# Patient Record
Sex: FEMALE | Race: WHITE | NOT HISPANIC OR LATINO | Employment: UNEMPLOYED | URBAN - METROPOLITAN AREA
[De-identification: names, ages, dates, MRNs, and addresses within clinical notes are randomized per-mention and may not be internally consistent; named-entity substitution may affect disease eponyms.]

---

## 2017-01-12 ENCOUNTER — OFFICE VISIT (OUTPATIENT)
Dept: OBSTETRICS AND GYNECOLOGY | Facility: CLINIC | Age: 37
End: 2017-01-12

## 2017-01-12 VITALS
WEIGHT: 231.2 LBS | BODY MASS INDEX: 39.47 KG/M2 | HEART RATE: 107 BPM | HEIGHT: 64 IN | DIASTOLIC BLOOD PRESSURE: 91 MMHG | SYSTOLIC BLOOD PRESSURE: 129 MMHG

## 2017-01-12 DIAGNOSIS — Z12.4 ROUTINE CERVICAL SMEAR: ICD-10-CM

## 2017-01-12 DIAGNOSIS — N63.20 BREAST MASS, LEFT: ICD-10-CM

## 2017-01-12 DIAGNOSIS — N64.4 BREAST PAIN, LEFT: Primary | ICD-10-CM

## 2017-01-12 DIAGNOSIS — R10.2 VAGINAL PAIN: ICD-10-CM

## 2017-01-12 DIAGNOSIS — N39.41 URGE INCONTINENCE: ICD-10-CM

## 2017-01-12 PROCEDURE — 99204 OFFICE O/P NEW MOD 45 MIN: CPT | Performed by: OBSTETRICS & GYNECOLOGY

## 2017-01-12 NOTE — PROGRESS NOTES
Subjective   Kamilla Taylor is a 36 y.o. female    CC: Painful intercourse: for a few weeks has been experiencing intense pain with orgasm, at the upper left apex of vagina.  Has not occurred prior.  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  Has bothersome urge incontinence: no stress component.  Has nocturia; no involuntary voiding, or hematuria.  No involuntary voiding or loss of urine with coitus.  Has had mammography and ultrasound ofr left sbreast pain, occ. Skin redness of breast; and has a palpable tail of the breast or axillary lump.  Etiology has not been made.  No breast surg. Referral.      History of Present Illness: all three described in CC.    The following portions of the patient's history were reviewed and updated as appropriate: allergies, current medications, past family history, past medical history, past social history, past surgical history and problem list.    Review of Systems   Constitutional: Negative for fatigue and fever.   HENT: Negative for congestion.    Eyes: Negative for visual disturbance.   Respiratory: Negative for chest tightness and shortness of breath.    Cardiovascular: Negative for chest pain, palpitations and leg swelling.   Gastrointestinal: Negative for abdominal pain, blood in stool and nausea.   Endocrine: Negative for cold intolerance.   Genitourinary: Positive for urgency and vaginal pain. Negative for difficulty urinating, dysuria, frequency, genital sores, hematuria, menstrual problem, vaginal bleeding and vaginal discharge.   Musculoskeletal: Negative for back pain.   Skin: Negative for color change and rash.   Neurological: Negative for headaches.   Psychiatric/Behavioral: Negative for sleep disturbance.       History reviewed. No pertinent past medical history.  Past Surgical History   Procedure Laterality Date   • Hysterectomy     •  section     • Dilatation and curettage     • Endoscopy      • Colonoscopy     • Appendectomy     • Diagnostic laparoscopy       OB History      Para Term  AB TAB SAB Ectopic Multiple Living    5 4   1  1   4        Menstrual History:  OB History      Para Term  AB TAB SAB Ectopic Multiple Living    5 4   1  1   4         No LMP recorded (lmp unknown). Patient has had a hysterectomy.       Family History   Problem Relation Age of Onset   • Uterine cancer Mother    • Breast cancer Paternal Grandmother    • Breast cancer Paternal Aunt      History   Smoking Status   • Never Smoker   Smokeless Tobacco   • Not on file     History   Alcohol Use No       Objective   Physical Exam   Constitutional: She is oriented to person, place, and time. She appears well-developed and well-nourished.   HENT:   Head: Normocephalic and atraumatic.   Right Ear: External ear normal.   Left Ear: External ear normal.   Nose: Nose normal.   Eyes: EOM are normal. Pupils are equal, round, and reactive to light.   Neck: Normal range of motion. Neck supple. No thyromegaly present.   Cardiovascular: Normal rate, regular rhythm and normal heart sounds.    No murmur heard.  Pulmonary/Chest: Effort normal and breath sounds normal. She has no wheezes. She has no rales. She exhibits no tenderness. Right breast exhibits no inverted nipple, no mass, no nipple discharge, no skin change and no tenderness. Left breast exhibits mass and tenderness. Left breast exhibits no inverted nipple, no nipple discharge and no skin change. There is no breast swelling.       Abdominal: Soft. Bowel sounds are normal. She exhibits no distension and no mass. There is no tenderness. Hernia confirmed negative in the right inguinal area and confirmed negative in the left inguinal area.   Genitourinary: No breast tenderness. Pelvic exam was performed with patient supine. There is no rash, tenderness or lesion on the right labia. There is no rash, tenderness or lesion on the left labia. No erythema,  tenderness or bleeding in the vagina. No vaginal discharge found.   Genitourinary Comments: Reported hyst with bS&O    Some tenderness noted at vaginal cuff on the left of midline.  No mass palpated.    S/P adenocarcinoma in situ of cervix on hyst path.   Musculoskeletal: Normal range of motion. She exhibits no edema.   Neurological: She is alert and oriented to person, place, and time.   Skin: Skin is warm and dry. No rash noted.   Psychiatric: She has a normal mood and affect. Judgment normal.   Nursing note and vitals reviewed.        Assessment/Plan   Kamilla was seen today for annual exam.    Diagnoses and all orders for this visit:    Breast pain, left  -     Cancel: Mammo Diagnostic Left With CAD  -     US Breast Left Complete; Future  -     Ambulatory Referral to Breast Surgery  -     Mammo Diagnostic Bilateral With CAD; Future    Breast mass, left  -     Cancel: Mammo Diagnostic Left With CAD  -     US Breast Left Complete; Future  -     Ambulatory Referral to Breast Surgery  -     Mammo Diagnostic Bilateral With CAD; Future    Urge incontinence  -     Ambulatory Referral to Urology    Vaginal pain: intense vaginal cuff pain on left with orgasm; TVUS ordered.    Routine cervical smear  -     IGP, Aptima HPV, Rfx 16 / 18,45    Will see after TVUS.

## 2017-01-12 NOTE — MR AVS SNAPSHOT
Kamilla Taylor   2017 9:30 AM   Office Visit    Dept Phone:  [phone]   Encounter #:  [id number]    Provider:  Christiano Steve MD   Department:  [facility]                Your Full Care Plan              Your Updated Medication List      Notice  As of 2017 10:31 AM    You have not been prescribed any medications.            We Performed the Following     Ambulatory Referral to Breast Surgery     Ambulatory Referral to Urology     Mammo Diagnostic Left With CAD       You Were Diagnosed With        Codes Comments    Breast pain, left    -  Primary ICD-10-CM: N64.4  ICD-9-CM: 611.71     Breast mass, left     ICD-10-CM: N63  ICD-9-CM: 611.72     Urge incontinence     ICD-10-CM: N39.41  ICD-9-CM: 788.31     Vaginal pain     ICD-10-CM: R10.2  ICD-9-CM: 625.9       Instructions     None    Patient Instructions History      Upcoming Appointments     Visit Type Date Time Department    NEW PATIENT 2017  9:30 AM [facility] [place]    OB FOLLOWUP 2017 10:30 AM [facility] [place]    ULTRASOUND 2017  9:00 AM [facility] [place]      MyChart Signup     [facility] [email or web address] allows you to send messages to your doctor, view your test results, renew your prescriptions, schedule appointments, and more. To sign up, go to [employer] and click on the Sign Up Now link in the New User? box. Enter your [email or web address] Activation Code exactly as it appears below along with the last four digits of your Social Security Number and your Date of Birth () to complete the sign-up process. If you do not sign up before the expiration date, you must request a new code.    [email or web address] Activation Code: [email or web address]  Expires: 2017 10:31 AM    If you have questions, you can email [email or web address] [email or web address] or call [phone] to talk to our [email or web address] staff. Remember, [email or web address] is NOT to be used for urgent needs. For medical emergencies, dial  "911.               Other Info from Your Visit           Your Appointments     Jan 19, 2017  9:00 AM EST   Ultrasound with ULTRASOUND [facility] (--)    [address]  [facility] [address] [phone]            Jan 19, 2017 10:30 AM EST   OB FOLLOWUP with Christiano Steve MD   [facility] (--)    [address]  [facility] [address] [phone]              Allergies     Pseudoeph-doxylamine-dm-apap  Hives    Azithromycin  Hives    Morphine  Itching    Nickel  Other (See Comments)    REDNESS, SWELLING.  CANNOT WEAR NICKEL EARRINGS    Onion  Nausea And Vomiting      Reason for Visit     Annual Exam Pain with intercourse      Vital Signs     Blood Pressure Pulse Height Weight Last Menstrual Period Body Mass Index    129/91 107 64\" (162.6 cm) 231 lb 3.2 oz (105 kg) (LMP Unknown) 39.69 kg/m2    Smoking Status                   Never Smoker           Problems and Diagnoses Noted     Breast pain, left    -  Primary    Breast mass, left        Urge incontinence of urine        Vaginal pain            "

## 2017-01-16 LAB
CYTOLOGIST CVX/VAG CYTO: NORMAL
CYTOLOGY CVX/VAG DOC THIN PREP: NORMAL
DX ICD CODE: NORMAL
HIV 1 & 2 AB SER-IMP: NORMAL
HPV I/H RISK 4 DNA CVX QL PROBE+SIG AMP: NEGATIVE
OTHER STN SPEC: NORMAL
PATH REPORT.FINAL DX SPEC: NORMAL
STAT OF ADQ CVX/VAG CYTO-IMP: NORMAL

## 2017-01-17 ENCOUNTER — APPOINTMENT (OUTPATIENT)
Dept: WOMENS IMAGING | Facility: HOSPITAL | Age: 37
End: 2017-01-17

## 2017-01-17 PROCEDURE — 76641 ULTRASOUND BREAST COMPLETE: CPT | Performed by: RADIOLOGY

## 2017-01-17 PROCEDURE — 77062 BREAST TOMOSYNTHESIS BI: CPT | Performed by: RADIOLOGY

## 2017-01-17 PROCEDURE — 77066 DX MAMMO INCL CAD BI: CPT | Performed by: RADIOLOGY

## 2017-01-17 PROCEDURE — G0279 TOMOSYNTHESIS, MAMMO: HCPCS | Performed by: RADIOLOGY

## 2017-01-17 PROCEDURE — G0204 DX MAMMO INCL CAD BI: HCPCS | Performed by: RADIOLOGY

## 2017-01-18 ENCOUNTER — TELEPHONE (OUTPATIENT)
Dept: OBSTETRICS AND GYNECOLOGY | Facility: CLINIC | Age: 37
End: 2017-01-18

## 2017-01-18 NOTE — TELEPHONE ENCOUNTER
Radiology breast studies are OK.  If she is having breast pain, I'd like to recheck her in 2-3 weeks.

## 2017-01-31 ENCOUNTER — OFFICE VISIT (OUTPATIENT)
Dept: OBSTETRICS AND GYNECOLOGY | Facility: CLINIC | Age: 37
End: 2017-01-31

## 2017-01-31 ENCOUNTER — OFFICE VISIT (OUTPATIENT)
Dept: MAMMOGRAPHY | Facility: CLINIC | Age: 37
End: 2017-01-31

## 2017-01-31 ENCOUNTER — PROCEDURE VISIT (OUTPATIENT)
Dept: OBSTETRICS AND GYNECOLOGY | Facility: CLINIC | Age: 37
End: 2017-01-31

## 2017-01-31 VITALS
WEIGHT: 234 LBS | HEIGHT: 64 IN | BODY MASS INDEX: 39.95 KG/M2 | DIASTOLIC BLOOD PRESSURE: 62 MMHG | SYSTOLIC BLOOD PRESSURE: 118 MMHG | HEART RATE: 95 BPM | OXYGEN SATURATION: 95 % | TEMPERATURE: 97.8 F

## 2017-01-31 VITALS
HEART RATE: 96 BPM | SYSTOLIC BLOOD PRESSURE: 130 MMHG | BODY MASS INDEX: 39.95 KG/M2 | DIASTOLIC BLOOD PRESSURE: 81 MMHG | WEIGHT: 234 LBS | HEIGHT: 64 IN

## 2017-01-31 DIAGNOSIS — R10.2 PELVIC PAIN IN FEMALE: Primary | ICD-10-CM

## 2017-01-31 DIAGNOSIS — R10.2 VAGINAL PAIN: Primary | ICD-10-CM

## 2017-01-31 DIAGNOSIS — N39.41 URGE INCONTINENCE: ICD-10-CM

## 2017-01-31 DIAGNOSIS — N64.4 BREAST PAIN, LEFT: Primary | ICD-10-CM

## 2017-01-31 PROCEDURE — 99213 OFFICE O/P EST LOW 20 MIN: CPT | Performed by: OBSTETRICS & GYNECOLOGY

## 2017-01-31 PROCEDURE — 99203 OFFICE O/P NEW LOW 30 MIN: CPT | Performed by: SURGERY

## 2017-01-31 PROCEDURE — 76830 TRANSVAGINAL US NON-OB: CPT | Performed by: OBSTETRICS & GYNECOLOGY

## 2017-01-31 NOTE — MR AVS SNAPSHOT
Kamilla Taylor   2017 9:30 AM   Office Visit    Dept Phone:  [phone]   Encounter #:  [id number]    Provider:  Guanakito Rosa MD   Department:  [facility] BREAST SURGERY                Your Full Care Plan              Your Updated Medication List      Notice  As of 2017 10:44 AM    You have not been prescribed any medications.            Instructions     None    Patient Instructions History      Upcoming Appointments     Visit Type Date Time Department    NEW PATIENT 2017  9:30 AM [facility] [place]    ULTRASOUND 2017  2:00 PM [facility] [place]    GYN FOLLOW UP 2017  2:30 PM [facility] [place]      MyChart Signup     [facility] [employer] allows you to send messages to your doctor, view your test results, renew your prescriptions, schedule appointments, and more. To sign up, go to [employer] and click on the Sign Up Now link in the New User? box. Enter your [employer] Activation Code exactly as it appears below along with the last four digits of your Social Security Number and your Date of Birth () to complete the sign-up process. If you do not sign up before the expiration date, you must request a new code.    [employer] Activation Code: [email or web address]  Expires: 2017 10:44 AM    If you have questions, you can email [email or web address] or call [phone] to talk to our [employer] staff. Remember, [employer] is NOT to be used for urgent needs. For medical emergencies, dial 911.               Other Info from Your Visit           Your Appointments     2017  2:00 PM EST   Ultrasound with ULTRASOUND [facility] (--)    [address]  [facility] [address]   [phone]            2017  2:30 PM EST   GYN FOLLOW UP with Christiano Steve MD   [facility] (--)    [address]  [facility]  "[id number] [phone]              Allergies     Pseudoeph-doxylamine-dm-apap  Hives    Azithromycin  Hives    Morphine  Itching    Nickel  Other (See Comments)    REDNESS, SWELLING.  CANNOT WEAR NICKEL EARRINGS    Onion  Nausea And Vomiting      Reason for Visit     Mass Left Breast    Pain           Vital Signs     Blood Pressure Pulse Temperature Height Weight Last Menstrual Period    118/62 (BP Location: Left arm) 95 97.8 °F (36.6 °C) 64\" (162.6 cm) 234 lb (106 kg) (LMP Unknown)    Oxygen Saturation Body Mass Index Smoking Status             95% 40.17 kg/m2 Never Smoker           "

## 2017-01-31 NOTE — LETTER
January 31, 2017     Christiano Steve MD  [address]  [address]  [facility] [address]    Patient: Kamilla Taylor   YOB: 1980   Date of Visit: 1/31/2017       Dear Dr. Power MD:    Thank you for referring Kamilla Taylor to me for evaluation. Below are the relevant portions of my assessment and plan of care.  Plan:  She was accompanied today by her .  We discussed that the differential diagnosis of breast pain includes, but is not limited to: fibrocystic condition, macrocysts, fibroadenomas, breast cancer,  trauma to the breast or chest wall, inflammation or  other musculoskeletal disturbances of the chest wall.  There are no concerning findings on her examination today or on her most recent imaging for a focal cause of her pain- ie a mass, inflammation, or trauma. Both her exam and imaging are in good order. The most likely etiology of her breast pain is fibrocystic condition, meaning a hormonal responsiveness of the breast tissue.  We discussed that this pain can be aggravated by many things, including stress, caffeine, and fluctuations in hormone levels.  We discussed the most common interventions to alleviate her symptoms, including wearing a supportive bra, reducing caffeine intake, oral vitamin E 400 units qday or BID, or evening primrose oil 2000-3000mg orally daily. She understood.   She has already tried vitamin E and doesn't consume caffeine.  She will begin taking evening per Eduarda oil and I plan to prescribe diclofenac patches to see if that will help with her pain.    If you have questions, please do not hesitate to call me. I look forward to following Kamilla along with you.         Sincerely,        Guanakito Rosa MD        CC: No Recipients  Guanakito Rosa MD  1/31/2017  3:19 PM  Signed  Chief Complaint: Kamilla Taylor is a 36 y.o.. female here today for Mass (Left Breast) and Pain        History of Present Illness:  Patient presents with breast  pain.  The pain is only located on the left side and seems to be in the lateral aspect of the breast with occasional shooting pains to the nipple.  In 2015 there was some redness at the lateral edge of the areola and the patient noticed some skin dimpling nearby.  She actually had pictures of this on her cell phone and I was able to view them.  She describes the pain as sharp and moderate to severe.  The patient is frustrated that the pain will not go away.  2 weeks ago she had mammograms which did not show any abnormalities in the area where she is hurting.  They did discover a 22 mm fat-containing axillary lymph node that had a normal appearance although it was slightly large.  I have reviewed her imaging studies and agree with the findings.  She denies any pleuritic component to this pain and she has had a hysterectomy so she is unable to relate it to her menstrual cycle.  She does not consume caffeine or chocolate      Review of Systems:  Review of Systems   Genitourinary: Positive for dyspareunia and frequency.    Neurological: Positive for headaches.   All other systems reviewed and are negative.       Past Medical and Surgical History:  Breast Biopsy History:  Patient has not had a breast biopsy in the past.  Breast Cancer HIstory:  Patient does not have a past medical history of breast cancer.  Breast Operations, and year:  None    History   Smoking Status   • Never Smoker   Smokeless Tobacco   • Not on file     Obstetric History:  Patient is postmenopausal due to removal of her uterus in the following year:   Number of pregnancies:5  Number of live births: 4  Number of abortions or miscarriages: 1  Age of delivery of first child: 18  Patient breast fed, for the following lenth of time:42  Length of time taking birth control pills:1.5 years  Patient has never taken hormone replacement    Past Surgical History   Procedure Laterality Date   • Hysterectomy     •  section     • Dilatation  and curettage     • Endoscopy     • Colonoscopy     • Appendectomy     • Diagnostic laparoscopy     • Knee surgery     • Appendectomy         Past Medical History   Diagnosis Date   • Asthma        Prior Hospitalizations, other than for surgery or childbirth, and year:  none    Social History:  Patient is .  Patient has 2 daughters. and Patient has 2 sons.    Family History:  Family History   Problem Relation Age of Onset   • Uterine cancer Mother    • Depression Mother    • Diabetes Mother    • Hyperlipidemia Mother    • Breast cancer Paternal Grandmother    • Clotting disorder Paternal Grandmother    • Breast cancer Paternal Aunt    • Asthma Daughter    • Asthma Son    • Hyperlipidemia Maternal Aunt    • Melanoma Maternal Aunt    • Alcohol abuse Maternal Grandmother    • Diabetes Maternal Grandmother    • Hyperlipidemia Maternal Grandmother    • Heart attack Paternal Grandfather        Vital Signs:  Vitals:    01/31/17 1005   BP: 118/62   Pulse: 95   Temp: 97.8 °F (36.6 °C)   SpO2: 95%       Medications:    Current Outpatient Prescriptions:   •  Prenatal Vit-Min-FA-Fish Oil ([employer] PRENATAL GUMMY PO), Take 2 tablets by mouth daily., Disp: , Rfl:      Physical Examination:  General Appearance:   Patient is in no distress.  She is well kept and has an obese build.   Psychiatric:  Patient with appropriate mood and affect. Alert and oriented to self, time, and place.    Breast, RIGHT:  large sized, symmetric with the contralateral side.  Breast skin is without erythema, edema, rashes.  There are no visible abnormalities upon inspection during the arm-raising maneuver or with hands on hips in the sitting position. There is no nipple retraction, discharge or nipple/areolar skin changes.There are no masses palpable in the sitting or supine positions.    Breast, LEFT:  large sized, symmetric with the contralateral side.  Breast skin is without erythema, edema, rashes.  There are no visible abnormalities upon  inspection during the arm-raising maneuver or with hands on hips in the sitting position. There is no nipple retraction, discharge or nipple/areolar skin changes.There are no masses palpable in the sitting or supine positions.  She is rather tender in the upper outer quadrant with some fibrocystic nodularity but I do not detect a dominant mass.  I could not definitely feel the enlarged lymph node.    Lymphatic:  There is no axillary, cervical, infraclavicular, or supraclavicular adenopathy bilaterally.  Eyes:  Pupils are round and reactive to light.  Cardiovascular:  Heart rate and rhythm are regular.  Respiratory:  Lungs are clear bilaterally with no crackles or wheezes in any lung field.  Gastrointestinal:  Abdomen is soft, nondistended, and nontender.  There is no evidence of hepatosplenomegaly There are  scars from previous surgery.    Musculoskeletal:  Good strength in all 4 extremities.   There is good range of motion in both shoulders.    Skin:  No new skin lesions or rashes on the skin excluding the breast (see breast exam above).    Assessment:  There are no diagnoses linked to this encounter.    Plan:  She was accompanied today by her .  We discussed that the differential diagnosis of breast pain includes, but is not limited to: fibrocystic condition, macrocysts, fibroadenomas, breast cancer,  trauma to the breast or chest wall, inflammation or  other musculoskeletal disturbances of the chest wall.  There are no concerning findings on her examination today or on her most recent imaging for a focal cause of her pain- ie a mass, inflammation, or trauma. Both her exam and imaging are in good order. The most likely etiology of her breast pain is fibrocystic condition, meaning a hormonal responsiveness of the breast tissue.  We discussed that this pain can be aggravated by many things, including stress, caffeine, and fluctuations in hormone levels.  We discussed the most common interventions to alleviate  her symptoms, including wearing a supportive bra, reducing caffeine intake, oral vitamin E 400 units qday or BID, or evening primrose oil 2000-3000mg orally daily. She understood.   She has already tried vitamin E and doesn't consume caffeine.  She will begin taking evening per Eduarda oil and I plan to prescribe diclofenac patches to see if that will help with her pain.      CPT coding:    Next Appointment:  No Follow-up on file.

## 2017-01-31 NOTE — PROGRESS NOTES
Chief Complaint: Kamilla Taylor is a 36 y.o.. female here today for Mass (Left Breast) and Pain        History of Present Illness:  Patient presents with breast pain.  The pain is only located on the left side and seems to be in the lateral aspect of the breast with occasional shooting pains to the nipple.  In September 2015 there was some redness at the lateral edge of the areola and the patient noticed some skin dimpling nearby.  She actually had pictures of this on her cell phone and I was able to view them.  She describes the pain as sharp and moderate to severe.  The patient is frustrated that the pain will not go away.  2 weeks ago she had mammograms which did not show any abnormalities in the area where she is hurting.  They did discover a 22 mm fat-containing axillary lymph node that had a normal appearance although it was slightly large.  I have reviewed her imaging studies and agree with the findings.  She denies any pleuritic component to this pain and she has had a hysterectomy so she is unable to relate it to her menstrual cycle.  She does not consume caffeine or chocolate      Review of Systems:  Review of Systems   Genitourinary: Positive for dyspareunia and frequency.    Neurological: Positive for headaches.   All other systems reviewed and are negative.       Past Medical and Surgical History:  Breast Biopsy History:  Patient has not had a breast biopsy in the past.  Breast Cancer HIstory:  Patient does not have a past medical history of breast cancer.  Breast Operations, and year:  None    History   Smoking Status   • Never Smoker   Smokeless Tobacco   • Not on file     Obstetric History:  Patient is postmenopausal due to removal of her uterus in the following year:2012   Number of pregnancies:5  Number of live births: 4  Number of abortions or miscarriages: 1  Age of delivery of first child: 18  Patient breast fed, for the following lenth of time:42  Length of time taking birth control  pills:1.5 years  Patient has never taken hormone replacement    Past Surgical History   Procedure Laterality Date   • Hysterectomy     •  section     • Dilatation and curettage     • Endoscopy     • Colonoscopy     • Appendectomy     • Diagnostic laparoscopy     • Knee surgery     • Appendectomy         Past Medical History   Diagnosis Date   • Asthma        Prior Hospitalizations, other than for surgery or childbirth, and year:  none    Social History:  Patient is .  Patient has 2 daughters. and Patient has 2 sons.    Family History:  Family History   Problem Relation Age of Onset   • Uterine cancer Mother    • Depression Mother    • Diabetes Mother    • Hyperlipidemia Mother    • Breast cancer Paternal Grandmother    • Clotting disorder Paternal Grandmother    • Breast cancer Paternal Aunt    • Asthma Daughter    • Asthma Son    • Hyperlipidemia Maternal Aunt    • Melanoma Maternal Aunt    • Alcohol abuse Maternal Grandmother    • Diabetes Maternal Grandmother    • Hyperlipidemia Maternal Grandmother    • Heart attack Paternal Grandfather        Vital Signs:  Vitals:    17 1005   BP: 118/62   Pulse: 95   Temp: 97.8 °F (36.6 °C)   SpO2: 95%       Medications:    Current Outpatient Prescriptions:   •  Prenatal Vit-Min-FA-Fish Oil ([facility] PRENATAL GUMMY PO), Take 2 tablets by mouth daily., Disp: , Rfl:      Physical Examination:  General Appearance:   Patient is in no distress.  She is well kept and has an obese build.   Psychiatric:  Patient with appropriate mood and affect. Alert and oriented to self, time, and place.    Breast, RIGHT:  large sized, symmetric with the contralateral side.  Breast skin is without erythema, edema, rashes.  There are no visible abnormalities upon inspection during the arm-raising maneuver or with hands on hips in the sitting position. There is no nipple retraction, discharge or nipple/areolar skin changes.There are no masses palpable in the sitting or supine  positions.    Breast, LEFT:  large sized, symmetric with the contralateral side.  Breast skin is without erythema, edema, rashes.  There are no visible abnormalities upon inspection during the arm-raising maneuver or with hands on hips in the sitting position. There is no nipple retraction, discharge or nipple/areolar skin changes.There are no masses palpable in the sitting or supine positions.  She is rather tender in the upper outer quadrant with some fibrocystic nodularity but I do not detect a dominant mass.  I could not definitely feel the enlarged lymph node.    Lymphatic:  There is no axillary, cervical, infraclavicular, or supraclavicular adenopathy bilaterally.  Eyes:  Pupils are round and reactive to light.  Cardiovascular:  Heart rate and rhythm are regular.  Respiratory:  Lungs are clear bilaterally with no crackles or wheezes in any lung field.  Gastrointestinal:  Abdomen is soft, nondistended, and nontender.  There is no evidence of hepatosplenomegaly There are  scars from previous surgery.    Musculoskeletal:  Good strength in all 4 extremities.   There is good range of motion in both shoulders.    Skin:  No new skin lesions or rashes on the skin excluding the breast (see breast exam above).    Assessment:  There are no diagnoses linked to this encounter.    Plan:  She was accompanied today by her .  We discussed that the differential diagnosis of breast pain includes, but is not limited to: fibrocystic condition, macrocysts, fibroadenomas, breast cancer,  trauma to the breast or chest wall, inflammation or  other musculoskeletal disturbances of the chest wall.  There are no concerning findings on her examination today or on her most recent imaging for a focal cause of her pain- ie a mass, inflammation, or trauma. Both her exam and imaging are in good order. The most likely etiology of her breast pain is fibrocystic condition, meaning a hormonal responsiveness of the breast tissue.  We  discussed that this pain can be aggravated by many things, including stress, caffeine, and fluctuations in hormone levels.  We discussed the most common interventions to alleviate her symptoms, including wearing a supportive bra, reducing caffeine intake, oral vitamin E 400 units qday or BID, or evening primrose oil 2000-3000mg orally daily. She understood.   She has already tried vitamin E and doesn't consume caffeine.  She will begin taking evening per Eduarda oil and I plan to prescribe diclofenac patches to see if that will help with her pain.      CPT coding:    Next Appointment:  No Follow-up on file.

## 2017-01-31 NOTE — MR AVS SNAPSHOT
Kamilla Taylor   2017 2:00 PM   Procedure visit    Dept Phone:  [phone]   Encounter #:  [id number]    Provider:  [facility]   Department:  [facility] OB GYN                Your Full Care Plan              Your Updated Medication List      Notice  As of 2017  2:45 PM    You have not been prescribed any medications.            We Performed the Following     US Non-ob Transvaginal       You Were Diagnosed With        Codes Comments    Vaginal pain    -  Primary ICD-10-CM: R10.2  ICD-9-CM: 625.9       Instructions     None    Patient Instructions History      Upcoming Appointments     Visit Type Date Time Department    NEW PATIENT 2017  9:30 AM [facility]    ULTRASOUND 2017  2:00 PM [facility] [place]    GYN FOLLOW UP 2017  2:30 PM [facility] [place]    FOLLOW UP 2017  9:00 AM [facility]      Cellfirehart Signup     [facility] [employer] allows you to send messages to your doctor, view your test results, renew your prescriptions, schedule appointments, and more. To sign up, go to [email or web address] and click on the Sign Up Now link in the New User? box. Enter your [employer] Activation Code exactly as it appears below along with the last four digits of your Social Security Number and your Date of Birth () to complete the sign-up process. If you do not sign up before the expiration date, you must request a new code.    [employer] Activation Code: [email or web address]  Expires: 2017 10:44 AM    If you have questions, you can email Sanders [email or web address]" or call [phone] to talk to our [employer] staff. Remember, [employer] is NOT to be used for urgent needs. For medical emergencies, dial 911.               Other Info from Your Visit           Your Appointments     2017  9:00 AM EDT   Follow Up with Guanakito Rosa MD   [facility] BREAST SURGERY (--)    [address] [facility]  [facility] [id number] [phone]           Arrive 15 minutes prior to appointment.              Allergies     Pseudoeph-doxylamine-dm-apap  Hives    Azithromycin  Hives    Morphine  Itching    Nickel  Other (See Comments)    REDNESS, SWELLING.  CANNOT WEAR NICKEL EARRINGS    Onion  Nausea And Vomiting      Vital Signs     Last Menstrual Period Smoking Status                (LMP Unknown) Never Smoker          Problems and Diagnoses Noted     Vaginal pain    -  Primary

## 2017-01-31 NOTE — MR AVS SNAPSHOT
Kamilla Taylor   1/31/2017 2:30 PM   Office Visit    Dept Phone:  [phone]   Encounter #:  [id number]    Provider:  Christiano Steve MD   Department:  [facility] OB [facility]                Your Full Care Plan              Today's Medication Changes          These changes are accurate as of: 1/31/17  4:26 PM.  If you have any questions, ask your nurse or doctor.               New Medication(s)Ordered:     diclofenac 1.3 % patch patch   Commonly known as:  FLECTOR   Apply 1 patch topically 2 (Two) Times a Day.   Started by:  Guanakito Rosa MD            Where to Get Your Medications      These medications were sent to [address] [facility] - [phone] [facility] [phone] [place] [address]     Phone:  [phone]     diclofenac 1.3 % patch patch                  Your Updated Medication List          This list is accurate as of: 1/31/17  4:26 PM.  Always use your most recent med list.                diclofenac 1.3 % patch patch   Commonly known as:  FLECTOR   Apply 1 patch topically 2 (Two) Times a Day.               We Performed the Following     Ambulatory Referral to Urology       You Were Diagnosed With        Codes Comments    Pelvic pain in female    -  Primary ICD-10-CM: R10.2  ICD-9-CM: 625.9     Urge incontinence     ICD-10-CM: N39.41  ICD-9-CM: 788.31       Instructions     None    Patient Instructions History      Upcoming Appointments     Visit Type Date Time Department    NEW PATIENT 1/31/2017  9:30 AM [facility]    ULTRASOUND 1/31/2017  2:00 PM [facility]    GYN FOLLOW UP 1/31/2017  2:30 PM [facility] [place]    FOLLOW UP 4/25/2017  9:00 AM [facility]      MyChart Signup     [facility] [employer] allows you to send messages to your doctor, view your test results, renew your prescriptions, schedule appointments, and more. To sign up, go  "to [email or web address] and click on the Sign Up Now link in the New User? box. Enter your [employer] Activation Code exactly as it appears below along with the last four digits of your Social Security Number and your Date of Birth () to complete the sign-up process. If you do not sign up before the expiration date, you must request a new code.    [employer] Activation Code: [email or web address]  Expires: 2017 10:44 AM    If you have questions, you can email [email or web address] or call [phone] to talk to our [employer] staff. Remember, [employer] is NOT to be used for urgent needs. For medical emergencies, dial 911.               Other Info from Your Visit           Your Appointments     2017  9:00 AM EDT   Follow Up with Guanakito Rosa MD   [facility] BREAST SURGERY (--)    [address] [id number] [phone]           Arrive 15 minutes prior to appointment.              Allergies     Pseudoeph-doxylamine-dm-apap  Hives    Azithromycin  Hives    Morphine  Itching    Nickel  Other (See Comments)    REDNESS, SWELLING.  CANNOT WEAR NICKEL EARRINGS    Onion  Nausea And Vomiting      Reason for Visit     Follow-up f/u to US      Vital Signs     Blood Pressure Pulse Height Weight Last Menstrual Period Body Mass Index    130/81 96 64\" (162.6 cm) 234 lb (106 kg) (LMP Unknown) 40.17 kg/m2    Smoking Status                   Never Smoker           Problems and Diagnoses Noted     Pelvic pain in female    -  Primary    Urge incontinence of urine            "

## 2017-01-31 NOTE — PROGRESS NOTES
Subjective   Kamilla Taylor is a 36 y.o. female    CC: F/u to US; hx of intense vag cuff pain with orgasm.  No actual pain with coitus.    History of Present Illness  Pt has US today for pain with orgasm.  Has hx of ureteral lithiasis in the past with tx by ultrasound break-up and passage.  Has had C/S and hyst in the past. Also, appendectomy. Had incidental finding on path at [facility] of adenoca in situ of cx.    The following portions of the patient's history were reviewed and updated as appropriate: allergies, current medications, past family history, past medical history, past social history, past surgical history and problem list.    Review of Systems   Constitutional: Negative for fever.   Gastrointestinal: Negative for abdominal pain.   Genitourinary: Positive for pelvic pain. Negative for vaginal bleeding and vaginal discharge.   Musculoskeletal: Negative for back pain.       Past Medical History   Diagnosis Date   • Asthma      Past Surgical History   Procedure Laterality Date   • Hysterectomy     •  section     • Dilatation and curettage     • Endoscopy     • Colonoscopy     • Appendectomy     • Diagnostic laparoscopy     • Knee surgery     • Appendectomy       OB History      Para Term  AB TAB SAB Ectopic Multiple Living    5 4   1  1   4        Obstetric Comments    Took birth control for 1.5 years.         Menstrual History:  OB History      Para Term  AB TAB SAB Ectopic Multiple Living    5 4   1  1   4        Obstetric Comments    Took birth control for 1.5 years.            No LMP recorded (lmp unknown). Patient has had a hysterectomy.       Family History   Problem Relation Age of Onset   • Uterine cancer Mother    • Depression Mother    • Diabetes Mother    • Hyperlipidemia Mother    • Breast cancer Paternal Grandmother    • Clotting disorder Paternal Grandmother    • Breast cancer Paternal Aunt    • Asthma Daughter    • Asthma Son    • Hyperlipidemia Maternal  Aunt    • Melanoma Maternal Aunt    • Alcohol abuse Maternal Grandmother    • Diabetes Maternal Grandmother    • Hyperlipidemia Maternal Grandmother    • Heart attack Paternal Grandfather      History   Smoking Status   • Never Smoker   Smokeless Tobacco   • Not on file     History   Alcohol Use No       Objective   Physical Exam   Constitutional: She is oriented to person, place, and time. She appears well-developed and well-nourished.   Neurological: She is alert and oriented to person, place, and time.   Psychiatric: She has a normal mood and affect. Her behavior is normal. Judgment and thought content normal.   Nursing note and vitals reviewed.        Assessment/Plan   Kamilla was seen today for follow-up.    Diagnoses and all orders for this visit:    Pelvic pain in female (left vag cuff pain with orgasm) - normal ultrasound.  Will refer for another opinion.    Urge incontinence  -     Ambulatory Referral to Urology        Will call to schedule 2nd opinion as to etiology of the pain as described above.

## 2017-02-01 ENCOUNTER — TELEPHONE (OUTPATIENT)
Dept: MAMMOGRAPHY | Facility: CLINIC | Age: 37
End: 2017-02-01

## 2017-02-01 NOTE — TELEPHONE ENCOUNTER
Patient called to state the Rx you gave her for breast pain was denied by her insurance. Please advise patient at [phone]

## 2017-02-02 ENCOUNTER — TELEPHONE (OUTPATIENT)
Dept: MAMMOGRAPHY | Facility: CLINIC | Age: 37
End: 2017-02-02

## 2017-02-02 NOTE — TELEPHONE ENCOUNTER
Called the patient and left a message saying Dr. Rosa said it would be ok to use OTC Aspercreme lotion or cream twice a day to the painful area of the breast instead of the prescription. Told her she could call back if she had any further questions.     Danita Kothari RN

## 2017-02-03 ENCOUNTER — TELEPHONE (OUTPATIENT)
Dept: OBSTETRICS AND GYNECOLOGY | Facility: CLINIC | Age: 37
End: 2017-02-03

## 2017-02-03 NOTE — TELEPHONE ENCOUNTER
----- Message from Christiano Steve MD sent at 2/3/2017  8:10 AM EST -----  Regarding: consult with Dr. Sandoval  I've sent a message to Dr. Sandoval, alerting him to upcoming consult with pt.  Please schedule her a time to see him.

## 2017-02-06 ENCOUNTER — CONSULT (OUTPATIENT)
Dept: OBSTETRICS AND GYNECOLOGY | Facility: CLINIC | Age: 37
End: 2017-02-06

## 2017-02-06 VITALS
HEIGHT: 64 IN | WEIGHT: 235.2 LBS | HEART RATE: 102 BPM | BODY MASS INDEX: 40.15 KG/M2 | SYSTOLIC BLOOD PRESSURE: 134 MMHG | DIASTOLIC BLOOD PRESSURE: 79 MMHG

## 2017-02-06 DIAGNOSIS — IMO0002 ORGASM DISORDER: Primary | ICD-10-CM

## 2017-02-06 PROCEDURE — 99214 OFFICE O/P EST MOD 30 MIN: CPT | Performed by: OBSTETRICS & GYNECOLOGY

## 2017-02-06 NOTE — PROGRESS NOTES
Subjective   Kamilla Taylor is a 36 y.o. female.   Painful orgasm  History of Present Illness  Is one month history of very painful orgasm.  Denies any dyspareunia or pain leading up to orgasm.  Pain itself lasts approximately 2 minutes.  Patient is had a previous hysterectomy with ovarian preservation.  Hysterectomy was in 2012.  Pain is only with orgasm not with intercourse per se.  Pain is pinpointed in the left lower quadrant mostly in the abdominal wall not deep in the pelvis.  Pain seems to be triggered with posterior entry and female superior position compatible with flexion of the limbs.  PERHAPS abdominal musculature contraction.  The following portions of the patient's history were reviewed and updated as appropriate: allergies, current medications, past family history, past medical history, past social history, past surgical history and problem list.    Review of Systems   All other systems reviewed and are negative.      Objective   Physical Exam   Constitutional: She appears well-developed and well-nourished.   Abdominal: Soft. Bowel sounds are normal.   Questionable inguinal hernia left greater than right   Genitourinary: Vagina normal. Pelvic exam was performed with patient supine. There is no lesion on the right labia. There is no lesion on the left labia. Right adnexum displays no tenderness.   Genitourinary Comments: Cervix uterus is surgically absent   Psychiatric: She has a normal mood and affect. Her behavior is normal. Judgment and thought content normal.   Vitals reviewed.      Assessment/Plan   Kamilla was seen today for consult.    Diagnoses and all orders for this visit:    Orgasm disorder  Comments:  I feel this may be musculoskeletal and positional relative to hyperflexion of the hips causing muscle spasms in the inguinal and lower abdominal area.   will get general surgery consultation.  If negative will consider physical therapy   Time With direct consultation 30 minutes

## 2017-04-25 ENCOUNTER — OFFICE VISIT (OUTPATIENT)
Dept: MAMMOGRAPHY | Facility: CLINIC | Age: 37
End: 2017-04-25

## 2017-04-25 VITALS
SYSTOLIC BLOOD PRESSURE: 106 MMHG | HEART RATE: 106 BPM | OXYGEN SATURATION: 98 % | TEMPERATURE: 97.6 F | DIASTOLIC BLOOD PRESSURE: 80 MMHG

## 2017-04-25 DIAGNOSIS — N64.4 MASTODYNIA: Primary | ICD-10-CM

## 2017-04-25 PROCEDURE — 99213 OFFICE O/P EST LOW 20 MIN: CPT | Performed by: SURGERY

## 2017-04-25 RX ORDER — CETIRIZINE HYDROCHLORIDE 10 MG/1
TABLET ORAL
COMMUNITY
Start: 2017-04-07 | End: 2020-06-10

## 2017-04-25 RX ORDER — EPINEPHRINE 0.3 MG/.3ML
INJECTION INTRAMUSCULAR
COMMUNITY
Start: 2017-04-07

## 2017-04-25 NOTE — PROGRESS NOTES
Subjective   Kamilla Taylor is a 36 y.o. female     History of Present Illness she is here in follow-up for left breast pain that is located in the upper outer quadrant and into the axilla of that left breast.  It has been present for a number of years and she has tried multiple things to help alleviate the pain.  I last saw her in late January.  At that time we ordered diclofenac patches and evening primrose oil.  She is also added some organic cherry juice.  All of this seems to be helping.  She has days when there is no pain at all in the intensity of the pain has been decreased as well.  The patient has not detected any changes to the look of her breast.  She can still feel the left axillary lymph node and states that it tends to go up and down in size and is also occasionally tender.        Review of Systems  Past Medical History:   Diagnosis Date   • Asthma      Past Surgical History:   Procedure Laterality Date   • APPENDECTOMY     •  SECTION     • COLONOSCOPY     • DIAGNOSTIC LAPAROSCOPY     • DILATATION AND CURETTAGE     • ENDOSCOPY     • HYSTERECTOMY     • KNEE SURGERY       Family History   Problem Relation Age of Onset   • Uterine cancer Mother    • Depression Mother    • Diabetes Mother    • Hyperlipidemia Mother    • Breast cancer Paternal Grandmother    • Clotting disorder Paternal Grandmother    • Breast cancer Paternal Aunt    • Asthma Daughter    • Asthma Son    • Hyperlipidemia Maternal Aunt    • Melanoma Maternal Aunt    • Alcohol abuse Maternal Grandmother    • Diabetes Maternal Grandmother    • Hyperlipidemia Maternal Grandmother    • Heart attack Paternal Grandfather      Social History     Social History   • Marital status:      Spouse name: N/A   • Number of children: N/A   • Years of education: N/A     Occupational History   • Not on file.     Social History Main Topics   • Smoking status: Never Smoker   • Smokeless tobacco: Not on file   • Alcohol use No   • Drug use:  Defer   • Sexual activity: Yes     Partners: Male     Birth control/ protection: Surgical      Comment:      Other Topics Concern   • Not on file     Social History Narrative       Objective   Physical Exam  Vital signs-blood pressure 106/80, temperature 98.2, pulse 106, O2 saturation 98%  Gen. well-nourished, obese, white female in no acute distress  Heart-regular rate and rhythm  Lungs-clear to auscultation  Left breast-there is no skin dimpling or nipple retraction or nipple discharge.  There are some scattered fibrocystic changes in the upper outer quadrant and tenderness when palpating in this area.  I do not feel any dominant masses.  There is us soft axillary lymph node.  Right breast-there is no skin dimpling or nipple retraction or nipple discharge.  The fibrocystic changes are less noticeable in the upper outer quadrant when compared with the other side.  I did not elicit any tenderness with palpation and I don't feel any masses.  Lymphatics-there is no cervical/supraclavicular adenopathy.  She does have the soft mobile left axillary lymph node but nothing on the right side.  Abdomen-soft and nontender without masses  Assessment/Plan we will continue the diclofenac patches, evening primrose oil, and a cherry juice.  I will see her back in the office in about 6 months.  I did reorder her patches today.      The encounter diagnosis was Mastodynia.

## 2020-01-30 RX ORDER — LUBIPROSTONE 24 UG/1
24 CAPSULE ORAL 2 TIMES DAILY WITH MEALS
Qty: 180 CAPSULE | Refills: 2 | Status: SHIPPED | OUTPATIENT
Start: 2020-01-30 | End: 2021-09-01

## 2020-02-20 ENCOUNTER — OFFICE VISIT (OUTPATIENT)
Dept: GASTROENTEROLOGY | Facility: CLINIC | Age: 40
End: 2020-02-20

## 2020-02-20 VITALS
OXYGEN SATURATION: 99 % | HEIGHT: 64 IN | DIASTOLIC BLOOD PRESSURE: 80 MMHG | TEMPERATURE: 97.9 F | RESPIRATION RATE: 16 BRPM | HEART RATE: 100 BPM | SYSTOLIC BLOOD PRESSURE: 122 MMHG | WEIGHT: 227.1 LBS | BODY MASS INDEX: 38.77 KG/M2

## 2020-02-20 DIAGNOSIS — K59.04 CHRONIC IDIOPATHIC CONSTIPATION: Primary | ICD-10-CM

## 2020-02-20 DIAGNOSIS — K64.4 EXTERNAL HEMORRHOIDS: ICD-10-CM

## 2020-02-20 PROCEDURE — 99214 OFFICE O/P EST MOD 30 MIN: CPT | Performed by: NURSE PRACTITIONER

## 2020-02-20 RX ORDER — CYCLOBENZAPRINE HCL 10 MG
10 TABLET ORAL 3 TIMES DAILY PRN
COMMUNITY

## 2020-02-20 RX ORDER — GABAPENTIN 600 MG/1
600 TABLET ORAL 3 TIMES DAILY
COMMUNITY

## 2020-02-20 RX ORDER — ERENUMAB-AOOE 140 MG/ML
INJECTION, SOLUTION SUBCUTANEOUS
COMMUNITY
Start: 2020-01-30

## 2020-02-20 RX ORDER — OXYCODONE HYDROCHLORIDE AND ACETAMINOPHEN 5; 325 MG/1; MG/1
1 TABLET ORAL EVERY 6 HOURS PRN
COMMUNITY
End: 2020-06-10

## 2020-02-20 RX ORDER — SUMATRIPTAN 100 MG/1
TABLET, FILM COATED ORAL
COMMUNITY
Start: 2020-01-21

## 2020-02-20 RX ORDER — OLANZAPINE 10 MG/1
TABLET ORAL
COMMUNITY
Start: 2020-02-17

## 2020-02-20 RX ORDER — ADALIMUMAB 40MG/0.4ML
KIT SUBCUTANEOUS
COMMUNITY
Start: 2020-01-17

## 2020-02-20 RX ORDER — AMOXICILLIN 500 MG/1
CAPSULE ORAL
COMMUNITY
Start: 2020-02-15 | End: 2020-06-10

## 2020-02-20 RX ORDER — MONTELUKAST SODIUM 10 MG/1
10 TABLET ORAL NIGHTLY
COMMUNITY

## 2020-02-20 RX ORDER — BACLOFEN 10 MG/1
TABLET ORAL
COMMUNITY
Start: 2020-01-21 | End: 2020-06-10

## 2020-02-20 NOTE — PROGRESS NOTES
Chief Complaint   Patient presents with   • Follow-up     colorectal surgery and constipation       HPI  Patient presents today for follow-up.  Initial consult March 2019 with Dr. Mahoney for joint pain, facial swelling, rash, constipation and diarrhea.  EGD and colonoscopy were performed April 25, 2019.  Given abnormal small bowel biopsies celiac labs were obtained and were unremarkable.    Previous treatments include Linzess and Motegrity, Senokot, MiraLAX, and Trulance with no improvements in symptoms.   She is currently taking Amitiza 24 mcg twice daily.  She can go 4 to 8 days without having a bowel movement.  She will take Linzess 145 mcg 3 times a month and experience watery diarrhea.  Prior use of Linzess on its own did not prove constipation or cause diarrhea.    Given constipation she has had work-up including defecating proctoscopy Sorin and anal rectal manometry.  Given abnormalities on anal rectal manometry with absence of rectoanal inhibitory reflex biopsies were obtained by colorectal surgeon Dr. Ramos and were negative for Hirschsprung disease.    She has a history of elevated AUGIE, joint pain and facial rash with abnormal small bowel biopsies with possible early celiac but other autoimmune conditions can have similar results and patient was instructed to try a gluten-free diet to see if this improved symptoms and follow-up with rheumatology.  He has been diagnosed with psoriatic arthritis and started on adalimumab.  This has significantly improved joint pain.    She has been following a gluten-free diet since July.  Complaints of bloating and rash have resolved since starting gluten-free diet.     During rectal biopsy, colorectal surgeon banded hemorrhoids.  She has noticed a bulge and the band is visible at her anus.  This is uncomfortable.  She does strain to have a bowel movement.    REVIEW OF RECORDS: April 25, 2019 colonoscopy.  This was performed for change in bowel habits.  Bowel prep was  good.  Perianal digital rectal exam normal, terminal ileum normal, total colon appeared normal,        Review of Systems   Constitutional: Positive for fatigue.   Eyes: Negative.    Respiratory: Positive for cough.    Cardiovascular: Negative.    Endocrine: Negative.    Genitourinary: Negative.    Musculoskeletal: Positive for back pain and myalgias.   Skin: Negative.    Allergic/Immunologic: Negative.    Neurological: Positive for headaches.   Hematological: Negative.    Psychiatric/Behavioral: Positive for sleep disturbance.        Problem List:  There is no problem list on file for this patient.      Medical History:    Past Medical History:   Diagnosis Date   • Abnormal small bowel biopsy    • Alternating constipation and diarrhea    • Asthma    • Constipation    • Dyspepsia    • Elevated antinuclear antibody (AUGIE) level    • Elevated C-reactive protein    • Fatigue    • History of migraine headaches    • Lumbar spine painful on movement    • Morbid obesity (CMS/HCC)    • Multiple joint pain    • Pain, ankle    • Polyarthralgia    • PSA (psoriatic arthritis) (CMS/HCC)    • Rash    • Seronegative spondyloarthropathy (CMS/HCC)    • Stiff joint        Social History:    Social History     Socioeconomic History   • Marital status:      Spouse name: Not on file   • Number of children: Not on file   • Years of education: Not on file   • Highest education level: Not on file   Tobacco Use   • Smoking status: Never Smoker   Substance and Sexual Activity   • Alcohol use: No   • Drug use: Defer   • Sexual activity: Yes     Partners: Male     Birth control/protection: Surgical     Comment:        Family History:   Family History   Problem Relation Age of Onset   • Uterine cancer Mother    • Depression Mother    • Diabetes Mother    • Hyperlipidemia Mother    • Breast cancer Paternal Grandmother    • Clotting disorder Paternal Grandmother    • Breast cancer Paternal Aunt    • Asthma Daughter    • Asthma Son     • Hyperlipidemia Maternal Aunt    • Melanoma Maternal Aunt    • Alcohol abuse Maternal Grandmother    • Diabetes Maternal Grandmother    • Hyperlipidemia Maternal Grandmother    • Heart attack Paternal Grandfather    • Psoriasis Other    • Rheum arthritis Other    • Inflammatory bowel disease Other    • Colon cancer Neg Hx    • Colon polyps Neg Hx        Surgical History:   Past Surgical History:   Procedure Laterality Date   • APPENDECTOMY     •  SECTION     • COLONOSCOPY     • DIAGNOSTIC LAPAROSCOPY     • DILATATION AND CURETTAGE     • ENDOSCOPY     • HYSTERECTOMY     • KNEE SURGERY           Current Outpatient Medications:   •  AIMOVIG 140 MG/ML solution auto-injector, INJECT ONE PEN INTO THE SKIN EVERY 30 DAYS, Disp: , Rfl:   •  amoxicillin (AMOXIL) 500 MG capsule, TAKE 1 CAPSULE BY MOUTH THREE TIMES DAILY FOR 10 DAYS FINISH ALL OF THIS MEDICATION, Disp: , Rfl:   •  baclofen (LIORESAL) 10 MG tablet, , Disp: , Rfl:   •  cyclobenzaprine (FLEXERIL) 10 MG tablet, Take 10 mg by mouth 3 (Three) Times a Day As Needed for Muscle Spasms., Disp: , Rfl:   •  diclofenac (FLECTOR) 1.3 % patch patch, Apply 1 patch topically 2 (Two) Times a Day., Disp: 60 patch, Rfl: 2  •  EPIPEN 2-ROCIO 0.3 MG/0.3ML solution auto-injector injection, , Disp: , Rfl:   •  gabapentin (NEURONTIN) 600 MG tablet, Take 600 mg by mouth 3 (Three) Times a Day., Disp: , Rfl:   •  HUMIRA PEN 40 MG/0.4ML Pen-injector Kit, INJECT ONE PEN SUBCUTANEOUSLY EVERY OTHER WEEK, Disp: , Rfl:   •  linaclotide (LINZESS) 145 MCG capsule capsule, Take 145 mcg by mouth Every Morning Before Breakfast., Disp: , Rfl:   •  lubiprostone (AMITIZA) 24 MCG capsule, Take 1 capsule by mouth 2 (Two) Times a Day With Meals., Disp: 180 capsule, Rfl: 2  •  montelukast (SINGULAIR) 10 MG tablet, Take 10 mg by mouth Every Night., Disp: , Rfl:   •  OLANZapine (zyPREXA) 10 MG tablet, , Disp: , Rfl:   •  oxyCODONE-acetaminophen (PERCOCET) 5-325 MG per tablet, Take 1 tablet by mouth  Every 6 (Six) Hours As Needed., Disp: , Rfl:   •  SUMAtriptan (IMITREX) 100 MG tablet, , Disp: , Rfl:   •  cetirizine (zyrTEC) 10 MG tablet, , Disp: , Rfl:   •  EVENING PRIMROSE OIL PO, Take  by mouth., Disp: , Rfl:   •  linaclotide (LINZESS) 72 MCG capsule capsule, Take 1 capsule by mouth Every Morning Before Breakfast., Disp: 30 capsule, Rfl: 0    Allergies:  Clindamycin/lincomycin; Pseudoeph-doxylamine-dm-apap; Aspirin; Azithromycin; Morphine; Nickel; and Onion    Vitals:    02/20/20 0838   BP: 122/80   Pulse: 100   Resp: 16   Temp: 97.9 °F (36.6 °C)   SpO2: 99%   q    Physical Exam   Constitutional: She is oriented to person, place, and time. She appears well-developed and well-nourished. No distress.   HENT:   Head: Normocephalic and atraumatic.   Eyes: No scleral icterus.   Cardiovascular: Normal rate and regular rhythm.   Pulmonary/Chest: Effort normal and breath sounds normal.   Abdominal: Soft. Bowel sounds are normal. She exhibits no distension. There is no tenderness. There is no guarding.   UnAble to assess for hepatomegaly or masses given body habitus   Genitourinary:   Genitourinary Comments: Rectal exam with external hemorrhoid and visible banding material at sphincter   Neurological: She is alert and oriented to person, place, and time.   Skin: Skin is warm and dry. She is not diaphoretic.   Psychiatric: She has a normal mood and affect. Judgment normal.   Vitals reviewed.      Assessment/ Plan  Diagnoses and all orders for this visit:    Chronic idiopathic constipation  -     linaclotide (LINZESS) 72 MCG capsule capsule; Take 1 capsule by mouth Every Morning Before Breakfast.    External hemorrhoids    Other orders  -     linaclotide (LINZESS) 145 MCG capsule capsule; Take 145 mcg by mouth Every Morning Before Breakfast.  -     oxyCODONE-acetaminophen (PERCOCET) 5-325 MG per tablet; Take 1 tablet by mouth Every 6 (Six) Hours As Needed.  -     gabapentin (NEURONTIN) 600 MG tablet; Take 600 mg by  mouth 3 (Three) Times a Day.  -     cyclobenzaprine (FLEXERIL) 10 MG tablet; Take 10 mg by mouth 3 (Three) Times a Day As Needed for Muscle Spasms.  -     montelukast (SINGULAIR) 10 MG tablet; Take 10 mg by mouth Every Night.  -     HUMIRA PEN 40 MG/0.4ML Pen-injector Kit; INJECT ONE PEN SUBCUTANEOUSLY EVERY OTHER WEEK  -     amoxicillin (AMOXIL) 500 MG capsule; TAKE 1 CAPSULE BY MOUTH THREE TIMES DAILY FOR 10 DAYS FINISH ALL OF THIS MEDICATION  -     baclofen (LIORESAL) 10 MG tablet  -     AIMOVIG 140 MG/ML solution auto-injector; INJECT ONE PEN INTO THE SKIN EVERY 30 DAYS  -     OLANZapine (zyPREXA) 10 MG tablet  -     SUMAtriptan (IMITREX) 100 MG tablet    Reviewed recent colorectal surgery biopsy and work-up for constipation    Continue GLUTEN FREE DIET    Samples of Linzess 72 mcg provided to see if this is better tolerated that Linzess 145 mcg and continue Amitiza 24 mcg as discussed but if able to be only on one medication, this is our goal    To consider referral to Dr Jones for motility/constipation based on clinical course.     Follow up with Dr Ramos for hemorrhoid changes and recent banding    Return for Next scheduled follow up.      Discussion:  We will try lower dose of Linzess as Linzess 145 mcg is now causing watery bowel movements and she cannot take it daily due to symptoms.  Encouraged her to play with the dose of Amitiza as her goal is monotherapy however she did not have improvement with Linzess as monotherapy.    Patient has had significant improvement with gluten-free diet.  Small bowel biopsies were abnormal however celiac testing was normal.  Discussed possibility of very early celiac disease or other autoimmune conditions causing abnormality of the small bowel and benefit of gluten free diet with inflammation and additives/processed foods.    She is scheduled to see Dr. Mahoney April 2020.  We will keep this appointment however if symptoms improve, she will cancel the appointment and we  can consider referral based on clinical course.    All questions answered and support provided.    Patient verbalized agreement and understanding with the above plan, all questions answered and support provided.

## 2020-02-20 NOTE — PATIENT INSTRUCTIONS
Reviewed previous biopsy and rectal workup    Continue GLUTEN FREE DIET    Samples of Linzess 72 mcg provided to see if this is better tolerated that Linzess 145 mcg and continue Amitiza 24 mcg as discussed but if able to be only on one medication, this is our goal    To consider referral to Dr Jones for motility/constipation based on clinical course.     Follow up with Dr Ramos for hemorrhoid changes and recent banding

## 2020-05-05 ENCOUNTER — OFFICE VISIT (OUTPATIENT)
Dept: GASTROENTEROLOGY | Facility: CLINIC | Age: 40
End: 2020-05-05

## 2020-05-05 VITALS
WEIGHT: 222.6 LBS | OXYGEN SATURATION: 98 % | HEART RATE: 100 BPM | TEMPERATURE: 96.8 F | HEIGHT: 64 IN | BODY MASS INDEX: 38 KG/M2 | DIASTOLIC BLOOD PRESSURE: 78 MMHG | SYSTOLIC BLOOD PRESSURE: 120 MMHG

## 2020-05-05 DIAGNOSIS — K90.0 CELIAC DISEASE: Primary | ICD-10-CM

## 2020-05-05 DIAGNOSIS — K64.8 OTHER HEMORRHOIDS: ICD-10-CM

## 2020-05-05 DIAGNOSIS — K59.09 OTHER CONSTIPATION: ICD-10-CM

## 2020-05-05 DIAGNOSIS — K31.84 GASTROPARESIS: ICD-10-CM

## 2020-05-05 DIAGNOSIS — K64.8 INTERNAL HEMORRHOIDS: ICD-10-CM

## 2020-05-05 PROCEDURE — 99214 OFFICE O/P EST MOD 30 MIN: CPT | Performed by: INTERNAL MEDICINE

## 2020-05-05 NOTE — PROGRESS NOTES
Follow-up; Constipation; and Abdominal Pain      HPI  Chart summary as follows: Initial consult was on March 20, 2019 for joint pain, facial swelling, facial rash constipation and diarrhea.  At that time blood work for celiac disease was negative.  She did undergo EGD and colonoscopy on April 25, 2019.  The results of these were nonspecific but possibly early celiac disease and otherwise negative.  Repeat celiac blood work in May 2019 was negative.    At her follow-up visit in June 2019 she had been on a gluten-free diet a 2-week trial did not seem to make much of an improvement but when restarting gluten she had significant symptoms she was scheduled to see a rheumatologist for a positive AUGIE of 1-180.  She was diagnosed with psoriatic arthritis with her rheumatologist and has been started on Humira for this.    For her constipation treatments with MiraLAX Amitiza, Linzess, Trulance, and Motegrity have been tried as well as combinations    June 12, 2019 gastric emptying study was abnormal at 2 and 3 hours but returned to near normal at 4 hours she was instructed to follow gastroparesis diet    Follow-up visit October 31 continued constipation right lower quadrant pain rectal bump thrombosed hemorrhoid noted anorectal manometry and defecating proctoscopy Sorin were ordered they were abnormal and concerning for possible Hirschsprung's and she was referred to Dr. Ramos who did perform banding and rectal biopsy which was negative for Hirschsprung's with the presence of ganglion cells    Patient reports that she is still struggling with abdominal pain.  This is mostly related to constipation.  She has been on a combination of Amitiza 24 as well as Linzess at 72 which had to be increased to the 145 and she is still having trouble going to the bathroom which has led to her abdominal pains.  However with a daily dose of Amitiza and use of Linzess as needed she is able to get herself to have a cleanout.  This is multiple  bowel movements that day which is difficult to leave the house with but it does do an effective job of cleaning her out.    She continues to be gluten-free as she states that when she has accidental or inadvertent exposure to gluten she has all of the symptoms of abdominal discomfort urgency and diarrhea.    She is uses narcotics very sporadically only      Review of Systems   Constitutional: Negative.    Eyes: Negative.    Respiratory: Negative.    Cardiovascular: Negative.    Gastrointestinal: Positive for abdominal pain and constipation.   Endocrine: Negative.    Genitourinary: Negative.    Musculoskeletal: Positive for arthralgias.   Skin: Negative.    Allergic/Immunologic: Positive for environmental allergies and food allergies.   Neurological: Positive for headaches.   Hematological: Negative.    Psychiatric/Behavioral: Negative.         I have reviewed and confirmed the accuracy of the HPI and ROS as documented by the APRN Cameron Mahoney MD     Problem List:  There is no problem list on file for this patient.      Medical History:    Past Medical History:   Diagnosis Date   • Abnormal small bowel biopsy    • Alternating constipation and diarrhea    • Asthma    • Constipation    • Dyspepsia    • Elevated antinuclear antibody (AUGIE) level    • Elevated C-reactive protein    • Fatigue    • History of migraine headaches    • Lumbar spine painful on movement    • Morbid obesity (CMS/HCC)    • Multiple joint pain    • Pain, ankle    • Polyarthralgia    • PSA (psoriatic arthritis) (CMS/HCC)    • Rash    • Seronegative spondyloarthropathy (CMS/HCC)    • Stiff joint         Social History:    Social History     Socioeconomic History   • Marital status:      Spouse name: Not on file   • Number of children: Not on file   • Years of education: Not on file   • Highest education level: Not on file   Tobacco Use   • Smoking status: Never Smoker   • Smokeless tobacco: Never Used   Substance and Sexual Activity   •  Alcohol use: No   • Drug use: Defer   • Sexual activity: Yes     Partners: Male     Birth control/protection: Surgical     Comment:        Family History:   Family History   Problem Relation Age of Onset   • Uterine cancer Mother    • Depression Mother    • Diabetes Mother    • Hyperlipidemia Mother    • Breast cancer Paternal Grandmother    • Clotting disorder Paternal Grandmother    • Breast cancer Paternal Aunt    • Asthma Daughter    • Asthma Son    • Hyperlipidemia Maternal Aunt    • Melanoma Maternal Aunt    • Alcohol abuse Maternal Grandmother    • Diabetes Maternal Grandmother    • Hyperlipidemia Maternal Grandmother    • Heart attack Paternal Grandfather    • Psoriasis Other    • Rheum arthritis Other    • Inflammatory bowel disease Other    • Colon cancer Neg Hx    • Colon polyps Neg Hx        Surgical History:   Past Surgical History:   Procedure Laterality Date   • APPENDECTOMY     •  SECTION     • COLONOSCOPY     • DIAGNOSTIC LAPAROSCOPY     • DILATATION AND CURETTAGE     • ENDOSCOPY     • HYSTERECTOMY     • KNEE SURGERY           Current Outpatient Medications:   •  AIMOVIG 140 MG/ML solution auto-injector, INJECT ONE PEN INTO THE SKIN EVERY 30 DAYS, Disp: , Rfl:   •  baclofen (LIORESAL) 10 MG tablet, , Disp: , Rfl:   •  cetirizine (zyrTEC) 10 MG tablet, , Disp: , Rfl:   •  cyclobenzaprine (FLEXERIL) 10 MG tablet, Take 10 mg by mouth 3 (Three) Times a Day As Needed for Muscle Spasms., Disp: , Rfl:   •  diclofenac (FLECTOR) 1.3 % patch patch, Apply 1 patch topically 2 (Two) Times a Day., Disp: 60 patch, Rfl: 2  •  EPIPEN 2-ROCIO 0.3 MG/0.3ML solution auto-injector injection, , Disp: , Rfl:   •  EVENING PRIMROSE OIL PO, Take  by mouth., Disp: , Rfl:   •  gabapentin (NEURONTIN) 600 MG tablet, Take 600 mg by mouth 3 (Three) Times a Day., Disp: , Rfl:   •  HUMIRA PEN 40 MG/0.4ML Pen-injector Kit, INJECT ONE PEN SUBCUTANEOUSLY EVERY OTHER WEEK, Disp: , Rfl:   •  linaclotide (LINZESS) 145 MCG  capsule capsule, Take 145 mcg by mouth Every Morning Before Breakfast., Disp: , Rfl:   •  lubiprostone (AMITIZA) 24 MCG capsule, Take 1 capsule by mouth 2 (Two) Times a Day With Meals., Disp: 180 capsule, Rfl: 2  •  montelukast (SINGULAIR) 10 MG tablet, Take 10 mg by mouth Every Night., Disp: , Rfl:   •  OLANZapine (zyPREXA) 10 MG tablet, , Disp: , Rfl:   •  oxyCODONE-acetaminophen (PERCOCET) 5-325 MG per tablet, Take 1 tablet by mouth Every 6 (Six) Hours As Needed., Disp: , Rfl:   •  SUMAtriptan (IMITREX) 100 MG tablet, , Disp: , Rfl:   •  amoxicillin (AMOXIL) 500 MG capsule, TAKE 1 CAPSULE BY MOUTH THREE TIMES DAILY FOR 10 DAYS FINISH ALL OF THIS MEDICATION, Disp: , Rfl:   •  linaclotide (LINZESS) 72 MCG capsule capsule, Take 1 capsule by mouth Every Morning Before Breakfast., Disp: 30 capsule, Rfl: 0    Allergies:   Allergies   Allergen Reactions   • Clindamycin/Lincomycin Anaphylaxis   • Diclofenac Irritability   • Pseudoeph-Doxylamine-Dm-Apap Hives   • Aspirin Hives   • Azithromycin Hives   • Morphine Itching   • Nickel Other (See Comments)     REDNESS, SWELLING.  CANNOT WEAR NICKEL EARRINGS   • Onion Nausea And Vomiting        The following portions of the patient's history were reviewed and updated as appropriate: allergies, current medications, past family history, past medical history, past social history, past surgical history and problem list.    There were no vitals filed for this visit.  There were no vitals filed for this visit.  There is no height or weight on file to calculate BMI.      Physical Exam   Abdominal: Soft. Normal appearance and bowel sounds are normal. She exhibits no distension, no pulsatile midline mass and no mass. There is tenderness. There is no rigidity, no rebound and no guarding.   Some mild abdominal tenderness diffusely   Vitals reviewed.       Assessment/ Plan  Kamilla was seen today for follow-up, constipation and abdominal pain.    Diagnoses and all orders for this  visit:    Celiac disease    Other constipation    Internal hemorrhoids    Other hemorrhoids    Gastroparesis       The gastroparesis is borderline by exam  No follow-ups on file.      Discussion:  Lengthy discussion today with the patient.  We spent some time reviewing her history and all of the testing that has been done up until this point.  Due to COVID-19 she is staying home quite a bit and therefore is actually able to maintain her symptoms fairly well by using the Amitiza daily and the Linzess for breakthrough but we both recognize that this is not something we want to be the baseline and new norm for her when she is out and about on a regular basis.    She did try the squatty potty idea but did not use an angled elevation trying to re-create it at home and we discussed the anatomic and physiologic benefit of the open angle from the squatty potty itself and she will purchase one or re-create it to be used at home  We are not changing her current medications as they seem to be stabilized even though we want to be better    Reviewed her testing the only test we did not have today to review in person with the defecating proctoscopy Sorin.  I have discussed the case with motility and assuming the defecating proctoscopy Sorin does not show some significant anatomic problem upon review review we might be looking at a candidate for sacral nerve stimulation.  We are going to set that evaluation up.  If the evaluation with the colorectal surgeon does not afford a confidence that a temporary or a permanent sacral stimulator would be a good option then we will enlist the expert of the motility clinic here in [place] and have her see motility prior to making any further decisions    She will continue on a gluten-free diet.  She has biopsies that are consistent with features of celiac but serologies that are negative clinically she behaves with a good response to a gluten-free diet

## 2020-05-05 NOTE — PATIENT INSTRUCTIONS
1.  Discussed obtaining a squatty potty to open up the pelvic angle.  She has tried a flat box before but will try the angled 1 to open up the pelvic angle  2.  Continue with her current plan of daily Amitiza and as needed Linzess.  This is working well temporarily while she is able to be at home  3.  Schedule consultation with colorectal surgery to evaluate for possible sacral stimulator (based on review of defecating proctoscopy Sorin again)  4.  Consider motility referral if appropriate  5.  Continue gluten-free diet

## 2020-05-08 DIAGNOSIS — K59.09 OTHER CONSTIPATION: Primary | ICD-10-CM

## 2020-06-10 ENCOUNTER — OFFICE VISIT (OUTPATIENT)
Dept: SURGERY | Facility: CLINIC | Age: 40
End: 2020-06-10

## 2020-06-10 VITALS
DIASTOLIC BLOOD PRESSURE: 78 MMHG | HEART RATE: 93 BPM | SYSTOLIC BLOOD PRESSURE: 125 MMHG | BODY MASS INDEX: 37.46 KG/M2 | WEIGHT: 219.4 LBS | OXYGEN SATURATION: 98 % | TEMPERATURE: 98.2 F | HEIGHT: 64 IN

## 2020-06-10 DIAGNOSIS — R15.1 FECAL SMEARING: Primary | ICD-10-CM

## 2020-06-10 DIAGNOSIS — K59.04 CHRONIC IDIOPATHIC CONSTIPATION: ICD-10-CM

## 2020-06-10 PROBLEM — M12.262 VILLONODULAR SYNOVITIS OF LEFT KNEE: Status: ACTIVE | Noted: 2018-07-31

## 2020-06-10 PROBLEM — G89.29 CHRONIC LEFT SHOULDER PAIN: Status: ACTIVE | Noted: 2017-06-09

## 2020-06-10 PROBLEM — M75.82 TENDONITIS OF LEFT ROTATOR CUFF: Status: ACTIVE | Noted: 2017-07-24

## 2020-06-10 PROBLEM — M50.90 CERVICAL DISC DISORDER: Status: ACTIVE | Noted: 2017-08-30

## 2020-06-10 PROBLEM — K90.0 CELIAC DISEASE: Status: ACTIVE | Noted: 2019-08-14

## 2020-06-10 PROBLEM — M54.81 CERVICO-OCCIPITAL NEURALGIA: Status: ACTIVE | Noted: 2019-02-12

## 2020-06-10 PROBLEM — M25.512 CHRONIC LEFT SHOULDER PAIN: Status: ACTIVE | Noted: 2017-06-09

## 2020-06-10 PROBLEM — G43.719 INTRACTABLE CHRONIC MIGRAINE WITHOUT AURA AND WITHOUT STATUS MIGRAINOSUS: Status: ACTIVE | Noted: 2020-06-10

## 2020-06-10 PROBLEM — L40.50 PSORIATIC ARTHRITIS (HCC): Status: ACTIVE | Noted: 2019-08-14

## 2020-06-10 PROBLEM — R10.32 LEFT GROIN PAIN: Status: ACTIVE | Noted: 2017-03-21

## 2020-06-10 PROCEDURE — 99244 OFF/OP CNSLTJ NEW/EST MOD 40: CPT | Performed by: COLON & RECTAL SURGERY

## 2020-06-10 RX ORDER — DICLOFENAC POTASSIUM 50 MG/1
POWDER, FOR SOLUTION ORAL
COMMUNITY
Start: 2020-05-11

## 2020-06-10 RX ORDER — OXYCODONE AND ACETAMINOPHEN 7.5; 325 MG/1; MG/1
TABLET ORAL
COMMUNITY
Start: 2020-06-06

## 2020-06-10 RX ORDER — SENNOSIDES 8.6 MG/1
TABLET, FILM COATED ORAL
COMMUNITY
Start: 2020-05-07 | End: 2020-06-10

## 2020-06-10 RX ORDER — DOCUSATE SODIUM 100 MG/1
CAPSULE, LIQUID FILLED ORAL
COMMUNITY
End: 2020-06-10

## 2020-06-10 RX ORDER — NARATRIPTAN 2.5 MG/1
TABLET ORAL
COMMUNITY
Start: 2020-05-19

## 2020-06-10 RX ORDER — PROMETHAZINE HYDROCHLORIDE 12.5 MG/1
TABLET ORAL
COMMUNITY
End: 2020-06-10

## 2020-06-10 NOTE — PROGRESS NOTES
Kamilla Taylor is a 39 y.o. female who is seen as a consult at the request of Cameron Mahoney MD for Constipation and anal leakage.      HPI:    Pt c/o longstanding constipation, for many years.  She had rectal bx by Dr. Ramos, which ruled out Hirschsprung's.  Dr. Mahoney referred her here to discuss possible SNS.    She has had gastric emptying study, which was abnormal at 2 and 3 hours but returned to near normal at 4 hours per Dr. Mahoney's records.    Pt state she will not have a BM for 6 days, and then her abdominal pain becomes severe.  She takes Amitiza and Linzess together, which produces diarrhea for a day.   She usually takes Amitiza bid.  If she takes Linzess daily, it does not work.  She tried Motegrity, but this did not work.  When she does have a BM, she leaks stool about one hour afterward.    She used to have diarrhea due to gluten intolerance, but now this is better since she is gluten-free, for the past year.    She is on Humira every other week for RA.    Past Medical History:   Diagnosis Date   • Asthma    • Axillary lymphadenitis 04/2017    LEXT AXILLA   • Bilateral arm fractures    • Bloating 03/2019   • Breast pain, left 01/2017   • Carpal tunnel syndrome, bilateral    • Celiac disease    • Cervical cancer (CMS/[facility]) 2012    ADENOCARCINOMA IN SITU, S/P HYSTERECTOMY   • Cervical radiculitis 05/2017   • Cervical spondylosis 12/2018   • Cervico-occipital neuralgia    • Cervicogenic headache    • Chronic neck pain    • Constipation    • DDD (degenerative disc disease), cervical    • Dyspepsia    • Elevated C-reactive protein    • Endometriosis    • Fatigue    • Gastroparesis    • Groin pain, left 03/2017   • Hemorrhoids    • Hemorrhoids    • Hirschsprung's disease    • Kidney stones    • Left breast lump 09/2015   • Lumbar spine painful on movement    • Mastodynia 04/2017   • Menometrorrhagia    • Migraines    • Morbid obesity (CMS/HCC)    • Multiple joint pain    • Neuritis of left  saphenous nerve 2017   • Orgasm disorder 2017    INTENSE PAIN WITH ORGASM   • Patellofemoral syndrome of left knee 2018   • Polyarthralgia    • Positive AUGIE (antinuclear antibody) 2019   • PSA (psoriatic arthritis) (CMS/HCC)    • Psoriasis    • SAB (spontaneous )      A1   • Seronegative spondyloarthropathy    • Snoring    • Tendonitis of left rotator cuff 2017   • Urge incontinence 2017   • Vaginal pain 2017   • Villonodular synovitis (pigmented), left knee        Past Surgical History:   Procedure Laterality Date   • ANORECTAL MANOMETRY N/A     MAX RESTING PRESSURE 115 mmHG, MAX SQUEEZE PRESSURE 205 mmHG, RECTOANAL INHIBITORY REFLEX ABSENT, DR. JEROMY CORBIN   • ANUS SURGERY N/A 2019    CR DEFECOGRAPHY: SMALL ANTERIOR RECTOCELE, LIMITED RELAXATION OF THE ANAL SPHINCTER, MINIMAL CTX OF PUBORECTALIS MUSCLE, DONE AT [facility]   • APPENDECTOMY N/A    •  SECTION N/A 1998   •  SECTION N/A 1999    AT [facility]   •  SECTION N/A 2001   •  SECTION N/A 2002    AT [facility]   • COLONOSCOPY N/A 2019    ENTIRE COLON WNL, NO SPECIMENS COLLECTED, DR. ZARA SIDDIQI AT [facility]   • CYSTOSCOPY BLADDER STONE LITHOTRIPSY N/A    • DIAGNOSTIC LAPAROSCOPY EXPLORATORY LAPAROTOMY N/A    • DILATATION AND CURETTAGE N/A    • ENDOMETRIAL FULGURATION N/A     FOR ENDOMETRIOSIS   • ENDOSCOPY N/A 2019    (+) CELIAC SPRUE, (-) CHRYSTAL TEST, MILD ERYTHEMA IN STOMACH, OTHERWISE WNL, DR. ZARA SIDDIQI AT [facility]   • ENDOSCOPY AND COLONOSCOPY N/A     ULCERS IN COLON PER PT   • FRACTURE SURGERY Bilateral     BILATERAL ARMS   • GASTRIC MOTILITY STUDY N/A 2019    GASTRIC EMPTYING STUDY: DELAYED GASTRIC EMPTYING, DONE AT [facility]   • HEMORRHOID BANDING N/A 2020    DR. JEROMY CORBIN AT [facility]   • HYSTERECTOMY N/A     ANUJA WITH BSO   • KNEE ARTHROSCOPY Left 2015    DR. RANDAL CHAU AT [facility]   • RECTAL EXAMINATION UNDER  ANESTHESIA N/A 01/21/2020    WITH ANAL BIOPSY AND EXCISION OF ANAL SKIN TAG, DR. JEROMY CORBIN AT [facility]       Social History:   reports that she has never smoked. She has never used smokeless tobacco. Drug use questions deferred to the physician. She reports that she does not drink alcohol.      Marriage status:     Family History   Problem Relation Age of Onset   • Uterine cancer Mother    • Depression Mother    • Diabetes Mother    • Hyperlipidemia Mother    • Cancer Mother    • Hypertension Mother    • Clotting disorder Mother    • Breast cancer Paternal Grandmother    • Clotting disorder Paternal Grandmother    • Stroke Paternal Grandmother    • Cancer Paternal Grandmother    • Breast cancer Paternal Aunt    • Cancer Paternal Aunt    • Asthma Daughter    • Asthma Son    • Hyperlipidemia Maternal Aunt    • Melanoma Maternal Aunt    • Cancer Maternal Aunt    • Clotting disorder Maternal Aunt    • Alcohol abuse Maternal Grandmother    • Diabetes Maternal Grandmother    • Hyperlipidemia Maternal Grandmother    • Stroke Maternal Grandmother    • Heart attack Paternal Grandfather    • Stroke Paternal Grandfather    • Heart disease Paternal Grandfather    • Psoriasis Other    • Rheum arthritis Other    • Inflammatory bowel disease Other    • Stroke Maternal Grandfather    • Clotting disorder Maternal Grandfather    • Colon cancer Neg Hx    • Colon polyps Neg Hx          Current Outpatient Medications:   •  AIMOVIG 140 MG/ML solution auto-injector, INJECT ONE PEN INTO THE SKIN EVERY 30 DAYS, Disp: , Rfl:   •  CAMBIA 50 MG pack, DISSOLVE 1 IN WATER & DRINK EVERY 12 HOURS AS NEEDED FOR HEADACHE, Disp: , Rfl:   •  cyclobenzaprine (FLEXERIL) 10 MG tablet, Take 10 mg by mouth 3 (Three) Times a Day As Needed for Muscle Spasms., Disp: , Rfl:   •  EPIPEN 2-ROCIO 0.3 MG/0.3ML solution auto-injector injection, , Disp: , Rfl:   •  gabapentin (NEURONTIN) 600 MG tablet, Take 600 mg by mouth 3 (Three) Times a Day., Disp: , Rfl:    •  HUMIRA PEN 40 MG/0.4ML Pen-injector Kit, INJECT ONE PEN SUBCUTANEOUSLY EVERY OTHER WEEK, Disp: , Rfl:   •  linaclotide (LINZESS) 145 MCG capsule capsule, Take 145 mcg by mouth Every Morning Before Breakfast., Disp: , Rfl:   •  lubiprostone (AMITIZA) 24 MCG capsule, Take 1 capsule by mouth 2 (Two) Times a Day With Meals., Disp: 180 capsule, Rfl: 2  •  montelukast (SINGULAIR) 10 MG tablet, Take 10 mg by mouth Every Night., Disp: , Rfl:   •  naratriptan (AMERGE) 2.5 MG tablet, TAKE 1 TABLET BY MOUTH AS NEEDED FOR MIGRAINE. MAY REPEAT IN 2 HOURS IF NEEDED NOT TO EXCEED 5 MG IN ANY 24 HOUR PERIOD, Disp: , Rfl:   •  OLANZapine (zyPREXA) 10 MG tablet, , Disp: , Rfl:   •  oxyCODONE-acetaminophen (PERCOCET) 7.5-325 MG per tablet, , Disp: , Rfl:   •  SUMAtriptan (IMITREX) 100 MG tablet, , Disp: , Rfl:   •  diclofenac (FLECTOR) 1.3 % patch patch, Apply 1 patch topically 2 (Two) Times a Day., Disp: 60 patch, Rfl: 2    Allergy  Clindamycin/lincomycin; Diclofenac; Pseudoeph-doxylamine-dm-apap; Aspirin; Nickel; Onion; Azithromycin; Clindamycin; Hydrocodone-acetaminophen; and Morphine    Review of Systems   Constitution: Negative for decreased appetite and weight gain.   HENT: Negative for congestion, hearing loss and hoarse voice.    Eyes: Negative for blurred vision, discharge and visual disturbance.   Cardiovascular: Negative for chest pain, cyanosis and leg swelling.   Respiratory: Negative for cough, shortness of breath, sleep disturbances due to breathing and snoring.    Endocrine: Negative for cold intolerance and heat intolerance.   Hematologic/Lymphatic: Does not bruise/bleed easily.   Skin: Negative for itching, poor wound healing and skin cancer.   Musculoskeletal: Negative for arthritis, back pain, joint pain and joint swelling.   Gastrointestinal: Negative for abdominal pain, change in bowel habit, bowel incontinence and constipation.   Genitourinary: Negative for bladder incontinence, dysuria and hematuria.    Neurological: Negative for brief paralysis, excessive daytime sleepiness, dizziness, focal weakness, headaches, light-headedness and weakness.   Psychiatric/Behavioral: Negative for altered mental status and hallucinations. The patient does not have insomnia.    Allergic/Immunologic: Negative for HIV exposure and persistent infections.       Vitals:    06/10/20 1354   BP: 125/78   Pulse: 93   Temp: 98.2 °F (36.8 °C)   SpO2: 98%     Body mass index is 37.66 kg/m².    Physical Exam   Constitutional: She is oriented to person, place, and time. She appears well-developed and well-nourished. No distress.   HENT:   Head: Normocephalic and atraumatic.   Nose: Nose normal.   Mouth/Throat: Oropharynx is clear and moist.   Eyes: Pupils are equal, round, and reactive to light. Conjunctivae and EOM are normal.   Neck: Normal range of motion. No tracheal deviation present.   Pulmonary/Chest: Effort normal and breath sounds normal. No respiratory distress.   Abdominal: Soft. She exhibits no distension.   Genitourinary:   Genitourinary Comments: Perianal exam: external hem - wnl.  +minor tag  JIMMY- decreased tone, no masses   Musculoskeletal: Normal range of motion. She exhibits no edema or deformity.   Neurological: She is alert and oriented to person, place, and time. No cranial nerve deficit. Coordination and gait normal.   Skin: Skin is warm and dry.   Psychiatric: She has a normal mood and affect. Her behavior is normal. Judgment normal.       Review of Medical Record:  I reviewed records Dr. Mahoney: pt with chronic constipation    Assessment:  1. Fecal smearing    2. Chronic idiopathic constipation        Plan:     She does have fecal soiling after she has a BM, which is about once per week.    Also discussed laparoscopic total abdominal colectomy.  Postop, she would have 4-6 BMs per day.  But because this is very loose stools it will make the fecal leakage even worse.    I recommend sacral nerve stimulator which should  help the soiling.  She can continue taking medication for the constipation    I discussed with them typical surgical time, hospital recovery, and home recovery.   I described to the patient risks, benefits, and alternatives. I discussed risks of surgery being heart attack, stroke, exacerbation of chronic medical illness, pneumonia, DVT, PTE, infection, bleeding, and injury to other organs during surgery. Patient expresses understanding.    Scribed for Harjeet Elizalde MD by Harjeet Elizalde MD  6/10/2020     This patient was evaluated by me, recommendations made, documentation reviewed, edited, and revised by me, Harjeet Elizalde MD

## 2020-06-19 ENCOUNTER — TELEPHONE (OUTPATIENT)
Dept: GASTROENTEROLOGY | Facility: CLINIC | Age: 40
End: 2020-06-19

## 2020-06-19 NOTE — TELEPHONE ENCOUNTER
Discussed with patient.  She reports she is waiting to see if the sacral stimulator will be approved as she would rather not get the total colectomy.  She was told it may take three weeks to get the approval.  She will let us know if it gets approved and how she will proceed.   fadi

## 2020-06-19 NOTE — TELEPHONE ENCOUNTER
----- Message from Cameron Mahoney MD sent at 6/15/2020  2:30 PM EDT -----  Please discussed with patient that she is proceeding with sacral nerve stimulator and not total colectomy  ----- Message -----  From: Harjeet Elizalde MD  Sent: 6/15/2020   2:22 PM EDT  To: Cameron Mahoney MD

## 2021-08-31 ENCOUNTER — TELEPHONE (OUTPATIENT)
Dept: GASTROENTEROLOGY | Facility: CLINIC | Age: 41
End: 2021-08-31

## 2021-08-31 DIAGNOSIS — K90.0 CELIAC DISEASE: Primary | ICD-10-CM

## 2021-08-31 RX ORDER — LUBIPROSTONE 24 UG/1
24 CAPSULE ORAL 2 TIMES DAILY WITH MEALS
Qty: 180 CAPSULE | Refills: 2 | Status: CANCELLED | OUTPATIENT
Start: 2021-08-31

## 2021-08-31 NOTE — TELEPHONE ENCOUNTER
Patient called inquiring about why her Amitiza is not being refilled. Last ov was 5/5/2020, so she went ahead and scheduled an annual follow up visit with sw for 10/6/21. Request for Amitiza refill sent.

## 2021-09-01 RX ORDER — LUBIPROSTONE 24 UG/1
CAPSULE ORAL
Qty: 60 CAPSULE | Refills: 0 | Status: SHIPPED | OUTPATIENT
Start: 2021-09-01 | End: 2021-10-07 | Stop reason: SDUPTHER

## 2021-09-02 RX ORDER — LUBIPROSTONE 24 UG/1
24 CAPSULE, GELATIN COATED ORAL 2 TIMES DAILY WITH MEALS
Qty: 60 CAPSULE | Refills: 5 | Status: SHIPPED | OUTPATIENT
Start: 2021-09-02 | End: 2021-10-07 | Stop reason: SDUPTHER

## 2021-10-07 ENCOUNTER — OFFICE VISIT (OUTPATIENT)
Dept: GASTROENTEROLOGY | Facility: CLINIC | Age: 41
End: 2021-10-07

## 2021-10-07 VITALS
BODY MASS INDEX: 37.49 KG/M2 | RESPIRATION RATE: 17 BRPM | HEIGHT: 64 IN | WEIGHT: 219.6 LBS | OXYGEN SATURATION: 98 % | HEART RATE: 102 BPM | SYSTOLIC BLOOD PRESSURE: 142 MMHG | TEMPERATURE: 97.8 F | DIASTOLIC BLOOD PRESSURE: 82 MMHG

## 2021-10-07 DIAGNOSIS — K59.04 CHRONIC IDIOPATHIC CONSTIPATION: Primary | ICD-10-CM

## 2021-10-07 PROBLEM — L40.50 PSORIASIS WITH ARTHROPATHY (HCC): Status: ACTIVE | Noted: 2021-09-23

## 2021-10-07 PROBLEM — L40.50 PSORIATIC ARTHRITIS: Status: ACTIVE | Noted: 2021-10-07

## 2021-10-07 PROBLEM — M50.30 DDD (DEGENERATIVE DISC DISEASE), CERVICAL: Status: ACTIVE | Noted: 2021-04-07

## 2021-10-07 PROBLEM — K31.84 GASTROPARESIS: Status: ACTIVE | Noted: 2021-10-07

## 2021-10-07 PROBLEM — M54.81 CERVICO-OCCIPITAL NEURALGIA: Status: ACTIVE | Noted: 2021-10-07

## 2021-10-07 PROBLEM — G43.909 MIGRAINE HEADACHE: Status: ACTIVE | Noted: 2021-10-07

## 2021-10-07 PROCEDURE — 99213 OFFICE O/P EST LOW 20 MIN: CPT | Performed by: NURSE PRACTITIONER

## 2021-10-07 RX ORDER — LORATADINE 10 MG/1
TABLET ORAL
COMMUNITY

## 2021-10-07 RX ORDER — METOCLOPRAMIDE 5 MG/1
TABLET ORAL
COMMUNITY
Start: 2021-09-20

## 2021-10-07 RX ORDER — PROMETHAZINE HYDROCHLORIDE 25 MG/1
TABLET ORAL
COMMUNITY

## 2021-10-07 RX ORDER — LEFLUNOMIDE 20 MG/1
TABLET ORAL
COMMUNITY

## 2021-10-07 RX ORDER — FEXOFENADINE HCL 180 MG/1
TABLET ORAL
COMMUNITY

## 2021-10-07 RX ORDER — IBUPROFEN 800 MG/1
TABLET ORAL
COMMUNITY

## 2021-10-07 RX ORDER — SECUKINUMAB 150 MG/ML
INJECTION SUBCUTANEOUS
COMMUNITY
End: 2021-10-07

## 2021-10-07 RX ORDER — DIPHENHYDRAMINE HCL 25 MG
CAPSULE ORAL
COMMUNITY

## 2021-10-07 RX ORDER — METHYLPREDNISOLONE 4 MG/1
TABLET ORAL
COMMUNITY
Start: 2021-06-22

## 2021-10-07 RX ORDER — LUBIPROSTONE 24 UG/1
24 CAPSULE, GELATIN COATED ORAL 2 TIMES DAILY WITH MEALS
Qty: 180 CAPSULE | Refills: 3 | Status: SHIPPED | OUTPATIENT
Start: 2021-10-07

## 2021-10-07 RX ORDER — AMOXICILLIN 500 MG/1
CAPSULE ORAL
COMMUNITY

## 2021-10-07 RX ORDER — CARBAMAZEPINE 100 MG/1
TABLET, EXTENDED RELEASE ORAL
COMMUNITY
Start: 2021-09-20

## 2021-10-07 RX ORDER — TRIAMCINOLONE ACETONIDE 1 MG/G
CREAM TOPICAL
COMMUNITY
Start: 2021-09-15

## 2021-10-07 RX ORDER — DIAPER,BRIEF,INFANT-TODD,DISP
EACH MISCELLANEOUS
COMMUNITY

## 2021-10-07 RX ORDER — INFLIXIMAB 100 MG/10ML
INJECTION, POWDER, LYOPHILIZED, FOR SOLUTION INTRAVENOUS
COMMUNITY

## 2021-10-07 RX ORDER — FLUOROMETHOLONE 0.1 %
SUSPENSION, DROPS(FINAL DOSAGE FORM)(ML) OPHTHALMIC (EYE)
COMMUNITY
Start: 2021-09-28

## 2021-10-07 RX ORDER — NALOXONE HYDROCHLORIDE 4 MG/.1ML
SPRAY NASAL
COMMUNITY

## 2021-10-07 RX ORDER — BENZONATATE 200 MG/1
CAPSULE ORAL
COMMUNITY

## 2021-10-07 RX ORDER — SENNA PLUS 8.6 MG/1
TABLET ORAL
COMMUNITY

## 2021-10-07 NOTE — PROGRESS NOTES
"Chief Complaint   Patient presents with   • Chronic Idopathic Constipation         History of Present Illness  40-year-old female presents today for follow-up.  He has a history of constipation, anal leakage, gluten sensitivity, thrombosed hemorrhoid, abnormal anal rectal manometry, hemorrhoidal banding and previous rectal biopsy negative for Hirschsprung's.  She has had previous work-up including EGD and colonoscopy and celiac serologies 2019 that were negative.  She continues on a gluten-free diet.    Constipation is best controlled with combination of Amitiza 24 mcg daily and Linzess 145 micrograms for breakthrough.  She can go up to 6 days without a bowel movement with worsening and severe abdominal pain.    She has had surgical consult with Dr. Elizalde for SNS consideration, June 2020.    For constipation previous treatments include MiraLAX, Amitiza, Linzess, lactulose, Trulance and Motegrity as monotherapy as well as in combination.    Previous gastric emptying study abnormal at 2 and 3 hours but returned to near normal at 4 hours.    She continues to follow a gluten-free diet for gluten intolerance.    She has started infliximab for rheumatoid arthritis.    No family history of colon cancer or colon polyps.    Review of Systems   Gastrointestinal: Positive for constipation.   Musculoskeletal: Positive for arthralgias.      Result Review :         Vital Signs:   /82   Pulse 102   Temp 97.8 °F (36.6 °C) (Temporal)   Resp 17   Ht 162.6 cm (64\")   Wt 99.6 kg (219 lb 9.6 oz)   SpO2 98%   BMI 37.69 kg/m²     Body mass index is 37.69 kg/m².     Physical Exam  Vitals reviewed.   Constitutional:       General: She is not in acute distress.     Appearance: Normal appearance. She is not ill-appearing, toxic-appearing or diaphoretic.   Eyes:      General: No scleral icterus.  Abdominal:      General: Bowel sounds are normal. There is no distension.      Palpations: Abdomen is soft. Abdomen is not rigid. There " is no mass or pulsatile mass.      Tenderness: There is abdominal tenderness. There is no guarding or rebound.      Comments: Mild lower abdominal tenderness with light palpation   Neurological:      Mental Status: She is alert.       Assessment and Plan    Diagnoses and all orders for this visit:    1. Chronic idiopathic constipation (Primary)  -     Amitiza 24 MCG capsule; Take 1 capsule by mouth 2 (Two) Times a Day With Meals.  Dispense: 180 capsule; Refill: 3    Continue gluten-free diet for improvement in urgency, diarrhea and other GI symptoms.    Chronic constipation, refractory to monotherapy, continue Amitiza twice daily and as needed Linzess, refill for Amitiza sent electronically to pharmacy.    Average colon cancer risk, last colonoscopy 2019, plans for colonoscopy 2029 for colon cancer screening however this can be performed sooner if symptoms change or condition warrants.    Follow-up visit yearly for refills, sooner if symptoms change or condition warrants.            There are no Patient Instructions on file for this visit.        EMR Dragon/Transcription Disclaimer:  This document has been Dictated utilizing Dragon dictation.

## 2021-10-07 NOTE — PATIENT INSTRUCTIONS
Continue gluten-free diet for improvement in urgency, diarrhea and other GI symptoms.    Chronic constipation, refractory to monotherapy, continue Amitiza twice daily and as needed Linzess, refill for Amitiza sent electronically to pharmacy.    Average colon cancer risk, last colonoscopy 2019, plans for colonoscopy 2029 for colon cancer screening however this can be performed sooner if symptoms change or condition warrants.    Follow-up visit yearly for refills, sooner if symptoms change or condition warrants.

## 2023-02-09 DIAGNOSIS — K59.04 CHRONIC IDIOPATHIC CONSTIPATION: ICD-10-CM

## 2023-02-09 RX ORDER — LUBIPROSTONE 24 UG/1
CAPSULE, GELATIN COATED ORAL
Qty: 60 CAPSULE | Refills: 0 | OUTPATIENT
Start: 2023-02-09

## 2023-02-27 DIAGNOSIS — K59.04 CHRONIC IDIOPATHIC CONSTIPATION: ICD-10-CM

## 2023-02-28 RX ORDER — LUBIPROSTONE 24 UG/1
CAPSULE, GELATIN COATED ORAL
Qty: 180 CAPSULE | Refills: 0 | OUTPATIENT
Start: 2023-02-28

## 2023-08-28 DIAGNOSIS — K59.04 CHRONIC IDIOPATHIC CONSTIPATION: ICD-10-CM

## 2023-08-29 RX ORDER — LUBIPROSTONE 24 UG/1
24 CAPSULE, GELATIN COATED ORAL 2 TIMES DAILY WITH MEALS
Qty: 180 CAPSULE | Refills: 3 | Status: SHIPPED | OUTPATIENT
Start: 2023-08-29

## 2024-06-25 ENCOUNTER — OFFICE VISIT (OUTPATIENT)
Dept: GASTROENTEROLOGY | Facility: CLINIC | Age: 44
End: 2024-06-25
Payer: COMMERCIAL

## 2024-06-25 VITALS
WEIGHT: 222.2 LBS | HEART RATE: 99 BPM | OXYGEN SATURATION: 97 % | SYSTOLIC BLOOD PRESSURE: 120 MMHG | TEMPERATURE: 96.9 F | DIASTOLIC BLOOD PRESSURE: 80 MMHG | BODY MASS INDEX: 37.94 KG/M2 | HEIGHT: 64 IN

## 2024-06-25 DIAGNOSIS — L40.50 PSORIATIC ARTHRITIS: Primary | ICD-10-CM

## 2024-06-25 DIAGNOSIS — K59.04 CHRONIC IDIOPATHIC CONSTIPATION: ICD-10-CM

## 2024-06-25 PROCEDURE — 99214 OFFICE O/P EST MOD 30 MIN: CPT | Performed by: NURSE PRACTITIONER

## 2024-06-25 PROCEDURE — 1160F RVW MEDS BY RX/DR IN RCRD: CPT | Performed by: NURSE PRACTITIONER

## 2024-06-25 PROCEDURE — 1159F MED LIST DOCD IN RCRD: CPT | Performed by: NURSE PRACTITIONER

## 2024-06-25 RX ORDER — BACLOFEN 10 MG/1
1 TABLET ORAL EVERY 6 HOURS PRN
COMMUNITY
Start: 2024-06-04

## 2024-06-25 RX ORDER — ERGOCALCIFEROL 1.25 MG/1
1 CAPSULE ORAL WEEKLY
COMMUNITY
Start: 2024-03-14

## 2024-06-25 RX ORDER — PHENOL 1.4 %
AEROSOL, SPRAY (ML) MUCOUS MEMBRANE
COMMUNITY

## 2024-06-25 RX ORDER — LUBIPROSTONE 24 UG/1
24 CAPSULE, GELATIN COATED ORAL 2 TIMES DAILY WITH MEALS
Qty: 180 CAPSULE | Refills: 3 | Status: SHIPPED | OUTPATIENT
Start: 2024-06-25

## 2024-06-25 RX ORDER — LANOLIN ALCOHOL/MO/W.PET/CERES
1 CREAM (GRAM) TOPICAL DAILY
COMMUNITY
Start: 2024-04-19

## 2024-06-25 RX ORDER — FEXOFENADINE HYDROCHLORIDE AND PSEUDOEPHEDRINE HYDROCHLORIDE 60; 120 MG/1; MG/1
1 TABLET, FILM COATED, EXTENDED RELEASE ORAL DAILY
COMMUNITY
Start: 2024-04-17

## 2024-06-25 NOTE — PROGRESS NOTES
"No chief complaint on file.          History of Present Illness    43-year-old female presents today for follow-up.  She has a history of gluten sensitivity with abnormal small bowel biopsies and normal celiac serologies, abnormal anal rectal manometry, chronic constipation, hemorrhoid banding and previous rectal biopsy negative for Hirschsprung's.  Last EGD and colonoscopy 2019.  Last office visit July 11, 2023.  She presents today for refills of Amitiza.  She continues on Amitiza 24 mcg twice daily.    She will use Linzess 145 mcg for constipation if she goes more than 4 to 5 days without a bowel movement but this is infrequent.    She is currently on infliximab infusions with Dr Tiffanie Orellana  at [facility] for diagnosis of rheumatoid arthritis.  She is due for her next infliximab infusion July 11, 2024  She is needing Rheumatology appoint/to establish care with a new provider prior to September 5, 2024 for ongoing Remicade infusions.  Result Review :           Vital Signs:   /80   Pulse 99   Temp 96.9 °F (36.1 °C)   Ht 162.6 cm (64.02\")   Wt 101 kg (222 lb 3.2 oz)   SpO2 97%   BMI 38.12 kg/m²     Body mass index is 38.12 kg/m².     Physical Exam  Vitals reviewed.   Constitutional:       General: She is not in acute distress.     Appearance: Normal appearance. She is not ill-appearing or toxic-appearing.   Eyes:      General: No scleral icterus.  Pulmonary:      Effort: Pulmonary effort is normal. No respiratory distress.   Skin:     Coloration: Skin is not jaundiced.   Neurological:      Mental Status: She is alert and oriented to person, place, and time.   Psychiatric:         Mood and Affect: Mood normal.         Behavior: Behavior normal.         Thought Content: Thought content normal.         Judgment: Judgment normal.           Assessment and Plan    Diagnoses and all orders for this visit:    1. Psoriatic arthritis (Primary)  -     Ambulatory Referral to " Rheumatology    2. Chronic idiopathic constipation  -     Amitiza 24 MCG capsule; Take 1 capsule by mouth 2 (Two) Times a Day With Meals.  Dispense: 180 capsule; Refill: 3         Average colon cancer risk, last colonoscopy 2019, plans for colonoscopy 2029 for colon cancer screening however this can be performed sooner if symptoms change or condition warrants.    Constipation, best controlled with Amitiza 24 mcg twice daily, refill provided.    Continue to avoid gluten products.    Continue Linzess 145 mcg rarely for more pronounced constipation.    Orders placed for rheumatology consult with Dr. Garcia to take over care for psoriatic arthritis as her rheumatologist is retiring, order placed.          EMR Dragon/Transcription Disclaimer:  This document has been Dictated utilizing Dragon dictation.

## 2024-11-06 ENCOUNTER — PATIENT MESSAGE (OUTPATIENT)
Dept: GASTROENTEROLOGY | Facility: CLINIC | Age: 44
End: 2024-11-06
Payer: COMMERCIAL

## 2024-11-06 DIAGNOSIS — L40.50 PSORIATIC ARTHRITIS: Primary | ICD-10-CM

## 2024-11-14 ENCOUNTER — TELEPHONE (OUTPATIENT)
Dept: GASTROENTEROLOGY | Facility: CLINIC | Age: 44
End: 2024-11-14
Payer: COMMERCIAL

## 2025-06-11 ENCOUNTER — LAB (OUTPATIENT)
Dept: LAB | Facility: HOSPITAL | Age: 45
End: 2025-06-11
Payer: COMMERCIAL

## 2025-06-11 ENCOUNTER — OFFICE VISIT (OUTPATIENT)
Dept: GASTROENTEROLOGY | Facility: CLINIC | Age: 45
End: 2025-06-11
Payer: COMMERCIAL

## 2025-06-11 ENCOUNTER — PREP FOR SURGERY (OUTPATIENT)
Dept: SURGERY | Facility: SURGERY CENTER | Age: 45
End: 2025-06-11
Payer: COMMERCIAL

## 2025-06-11 VITALS
TEMPERATURE: 97.6 F | DIASTOLIC BLOOD PRESSURE: 78 MMHG | HEIGHT: 64 IN | HEART RATE: 87 BPM | WEIGHT: 201.2 LBS | SYSTOLIC BLOOD PRESSURE: 118 MMHG | BODY MASS INDEX: 34.35 KG/M2 | OXYGEN SATURATION: 99 %

## 2025-06-11 DIAGNOSIS — K90.41 NON-CELIAC GLUTEN SENSITIVITY: ICD-10-CM

## 2025-06-11 DIAGNOSIS — K59.04 CHRONIC IDIOPATHIC CONSTIPATION: ICD-10-CM

## 2025-06-11 DIAGNOSIS — L40.50 PSORIATIC ARTHRITIS: ICD-10-CM

## 2025-06-11 DIAGNOSIS — R85.7 ABNORMAL SMALL BOWEL BIOPSY: Primary | ICD-10-CM

## 2025-06-11 DIAGNOSIS — R85.7 ABNORMAL SMALL BOWEL BIOPSY: ICD-10-CM

## 2025-06-11 DIAGNOSIS — Z12.11 SCREENING FOR MALIGNANT NEOPLASM OF COLON: Primary | ICD-10-CM

## 2025-06-11 DIAGNOSIS — K90.0 CELIAC DISEASE: ICD-10-CM

## 2025-06-11 PROCEDURE — 86364 TISS TRNSGLTMNASE EA IG CLAS: CPT | Performed by: NURSE PRACTITIONER

## 2025-06-11 PROCEDURE — 86231 EMA EACH IG CLASS: CPT | Performed by: NURSE PRACTITIONER

## 2025-06-11 PROCEDURE — 36415 COLL VENOUS BLD VENIPUNCTURE: CPT | Performed by: NURSE PRACTITIONER

## 2025-06-11 PROCEDURE — 82784 ASSAY IGA/IGD/IGG/IGM EACH: CPT | Performed by: NURSE PRACTITIONER

## 2025-06-11 RX ORDER — LUBIPROSTONE 24 UG/1
24 CAPSULE, GELATIN COATED ORAL 2 TIMES DAILY WITH MEALS
Qty: 180 CAPSULE | Refills: 3 | Status: SHIPPED | OUTPATIENT
Start: 2025-06-11 | End: 2025-06-17

## 2025-06-11 RX ORDER — SODIUM CHLORIDE 0.9 % (FLUSH) 0.9 %
3 SYRINGE (ML) INJECTION EVERY 12 HOURS SCHEDULED
OUTPATIENT
Start: 2025-06-11

## 2025-06-11 RX ORDER — METHYLPREDNISOLONE 4 MG/1
TABLET ORAL
COMMUNITY

## 2025-06-11 RX ORDER — SODIUM CHLORIDE, SODIUM LACTATE, POTASSIUM CHLORIDE, CALCIUM CHLORIDE 600; 310; 30; 20 MG/100ML; MG/100ML; MG/100ML; MG/100ML
30 INJECTION, SOLUTION INTRAVENOUS CONTINUOUS PRN
OUTPATIENT
Start: 2025-06-11 | End: 2025-06-12

## 2025-06-11 RX ORDER — SODIUM CHLORIDE 0.9 % (FLUSH) 0.9 %
10 SYRINGE (ML) INJECTION AS NEEDED
OUTPATIENT
Start: 2025-06-11

## 2025-06-11 RX ORDER — SUMATRIPTAN SUCCINATE 100 MG/1
TABLET ORAL
COMMUNITY
Start: 2025-05-22

## 2025-06-11 NOTE — PATIENT INSTRUCTIONS
Chronic constipation with abnormal anal rectal manometry, recommend avoiding straining, avoiding prolonged sitting on the commode and regular bowel movements  Constipation is best controlled with Amitiza 24 mcg twice daily, refill provided.     Continue to avoid gluten products.  - Recommend repeat EGD to reassess small bowel given abnormality on previous EGD 4/2019, orders placed for patient to schedule an upper endoscopy after maintaining a strict gluten-free diet for 4 to 6 months.     Continue Linzess 145 mcg rarely as needed during episodes of more pronounced constipation, samples provided.    Proceed with blood work today to assess celiac serologies and iron levels    Average colon cancer risk, colonoscopy up-to-date, recommended for colonoscopy at 10-year interval, 4/2029 however this can be performed sooner if symptoms change or condition warrants

## 2025-06-11 NOTE — PROGRESS NOTES
Chief Complaint   Patient presents with    Follow-up     Constipation, early celiac             GASTROENTEROLOGY SUMMARY    44-year-old female presents today for follow-up.  Last office visit June 25, 2024.  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  Last EGD and colonoscopy 2019.    She was previously on infliximab infusions with Dr Tiffanie Orellana at [facility] for diagnosis of psoriatic arthritis.    Last infusion 7/11/2024.  That rheumatology office closed and patient has had difficulty finding a new rheumatologist due to insurance issues.    History of Present Illness  The patient is a 44-year-old female who presents today for a yearly follow-up.   She is accompanied by her .    Constipation and Gastroparesis  She is experiencing symptoms of constipation and has been managing it with Amitiza 24 mcg, taken once nightly, and Linzess 145 mcg as needed, approximately once a month.   She reports that her gastroparesis symptoms have slightly worsened, which she attributes to her regular use of Amitiza.   She has been on Wegovy 2.4 mg for weight loss for nearly a year, resulting in a 40-pound weight loss.   Initially, she experienced severe diarrhea with Wegovy, but this has since resolved. She has been taking more pain medication due to a back injury sustained in an accident in 01/2025, which has led to increased constipation.   She does not take pain medication daily and notes that if she skips a day, she is able to have a bowel movement without medication.  - Onset: Constipation symptoms managed with Amitiza and Linzess; gastroparesis symptoms worsened slightly.  - Duration: Nearly a year on Wegovy; constipation increased due to pain medication after back injury in 01/2025.  -  Character: Constipation, gastroparesis, severe diarrhea initially with Wegovy.  - Alleviating/Aggravating Factors: Amitiza, Linzess, Wegovy, pain medication; skipping pain medication allows bowel movement.  - Severity: Severe diarrhea initially with Wegovy; constipation increased with pain medication.    Celiac Disease and Related Symptoms  She experiences severe cramps due to celiac disease, which she occasionally exacerbates by consuming gluten-containing foods.   She also reports intermittent rashes, which she is unsure if they are related to gluten consumption or psoriatic arthritis.   Her previous dermatologist, who has since retired, suspected lupus erythematosus. She needs to find a new dermatologist.  She was diagnosed with celiac disease by Dr. Mahoney after EGD April 2019 revealed abnormal small bowel biopsies with suspicion for early celiac sprue, follow-up celiac serologies were normal.  She continues to experience facial rashes when she consumes gluten.   She attempts to avoid gluten as much as possible but admits to recent lapses.  - Onset: Severe cramps due to celiac disease; facial rashes when consuming gluten.  - Location: Severe cramps, facial rashes.  - Character: Severe cramps, intermittent rashes.  - Alleviating/Aggravating Factors: Consuming gluten-containing foods.  - Severity: Severe cramps, facial rashes.    Psoriatic Arthritis and Related Symptoms  She has psoriatic arthritis and is currently taking prednisone and methylprednisolone for migraines.   She takes prednisone 5 days on and 5 days off as needed.   She typically experiences swelling during the summer months.  - Onset: Psoriatic arthritis symptoms managed with prednisone and methylprednisolone.  - Duration: Swelling typically during summer months.  - Character: Swelling.  - Alleviating/Aggravating Factors: Prednisone and methylprednisolone.    General Health Maintenance  She has not had a female exam because she had a full  "hysterectomy. She has a mammogram once a year because she has a lump that has been monitored. She had a colonoscopy in 2019 and everything looked good. She is not due for another colonoscopy until 2029 unless something changes. She reports no family history of colon cancer or colon polyps.    PAST SURGICAL HISTORY:  Full hysterectomy  Back surgery in 04/2024    SOCIAL HISTORY  Marital Status:   Diet: The patient avoids gluten due to gluten sensitivity with history of abnormal small bowel biopsies but occasionally consumes it, which results in cramps and pain.    FAMILY HISTORY  - Daughter: Crohn's disease, scheduled for gallbladder removal on June 27.  - Negative for colon cancer or colon polyps.     Result Review :           REVIEWED PERTINENT RESULTS/ LABS  No results found for: \"CASEREPORT\", \"FINALDX\"  Lab Results   Component Value Date    HGB 13.4 04/30/2024    MCV 89.8 04/22/2024     04/22/2024    ALT 17 10/18/2022    AST 14 10/18/2022       Vital Signs:   /78   Pulse 87   Temp 97.6 °F (36.4 °C)   Ht 162.6 cm (64\")   Wt 91.3 kg (201 lb 3.2 oz)   SpO2 99%   BMI 34.54 kg/m²     Body mass index is 34.54 kg/m².     Physical Exam  Vitals reviewed.   Constitutional:       General: She is not in acute distress.     Appearance: Normal appearance. She is not ill-appearing or toxic-appearing.   Eyes:      General: No scleral icterus.  Pulmonary:      Effort: Pulmonary effort is normal. No respiratory distress.   Skin:     Coloration: Skin is not jaundiced.   Neurological:      Mental Status: She is alert and oriented to person, place, and time.   Psychiatric:         Mood and Affect: Mood normal.         Behavior: Behavior normal.         Thought Content: Thought content normal.         Judgment: Judgment normal.         Assessment and Plan        Diagnoses and all orders for this visit:    1. Abnormal small bowel biopsy (Primary)  -     Celiac Disease Panel    2. Chronic idiopathic " constipation  -     Discontinue: Amitiza 24 MCG capsule; Take 1 capsule by mouth 2 (Two) Times a Day With Meals.  Dispense: 180 capsule; Refill: 3    3. Non-celiac gluten sensitivity  -     Celiac Disease Panel    4. Psoriatic arthritis  -     Ambulatory Referral to Rheumatology       Assessment & Plan  1.  Chronic constipation with history of abnormal anal rectal manometry in 2019 and previous hemorrhoid banding:  - The importance of regular bowel movements avoiding straining, prolonged sitting on the commode encouraged  - Continue Amitiza 24 mcg once at night.  - Use Linzess 145 mcg as needed, approximately once a month.  - Provided samples of Linzess 145 mcg.  - Increase Amitiza to twice daily if constipation worsens.  - Prescription for Amitiza 24 mcg twice daily sent to pharmacy.    2. Psoriatic Arthritis previously on infliximab infusions with Dr Tiffanie Orellana at [facility] for diagnosis of rheumatoid arthritis:  Last dose of infliximab July 2024, previous rheumatology clinic close and she has had difficulty establishing care with a new rheumatologist due to her insurance  -She continues to manage symptoms with prednisone 5 mg on and off as needed.  -Will place referral to a rheumatologist for further management.    3. 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  -She has worsening symptoms with gluten consumption including joint pain and facial rash and has done well following a gluten-free diet, encouraged patient to continue to strictly avoid gluten  -Orders placed for blood work to check celiac labs and iron levels.  - Recommend repeat EGD to reassess small bowel given abnormality on previous EGD 4/2019, orders placed for patient to schedule an upper endoscopy after maintaining a strict gluten-free diet for 4 to 6  months.    4. Health Maintenance:  - Colonoscopy in 2019 with no significant findings; next due in 2029 unless there are changes in family history or symptoms.  -Colon cancer screening has been placed in our electronic reminder system for 4/2029    5. Weight Management:  - On Wegovy 2.4 mg for weight loss.  - Reports losing 40 pounds over the past year.                 Patient Instructions   Chronic constipation with abnormal anal rectal manometry, recommend avoiding straining, avoiding prolonged sitting on the commode and regular bowel movements  Constipation is best controlled with Amitiza 24 mcg twice daily, refill provided.     Continue to avoid gluten products.  - Recommend repeat EGD to reassess small bowel given abnormality on previous EGD 4/2019, orders placed for patient to schedule an upper endoscopy after maintaining a strict gluten-free diet for 4 to 6 months.     Continue Linzess 145 mcg rarely as needed during episodes of more pronounced constipation, samples provided.    Proceed with blood work today to assess celiac serologies and iron levels    Average colon cancer risk, colonoscopy up-to-date, recommended for colonoscopy at 10-year interval, 4/2029 however this can be performed sooner if symptoms change or condition warrants      Patient or patient representative verbalized consent for the use of Ambient Listening during the visit with  DENNIS Dupont for chart documentation. 6/26/2025  18:04 EDT    EMR Dragon/Transcription Disclaimer:  This document has been Dictated utilizing Dragon dictation.

## 2025-06-12 LAB
ENDOMYSIUM IGA SER QL: NEGATIVE
IGA SERPL-MCNC: 115 MG/DL (ref 87–352)
TTG IGA SER-ACNC: <2 U/ML (ref 0–3)

## 2025-06-16 ENCOUNTER — TELEPHONE (OUTPATIENT)
Dept: GASTROENTEROLOGY | Facility: CLINIC | Age: 45
End: 2025-06-16
Payer: COMMERCIAL

## 2025-06-16 DIAGNOSIS — K59.04 CHRONIC IDIOPATHIC CONSTIPATION: ICD-10-CM

## 2025-06-17 RX ORDER — LUBIPROSTONE 24 UG/1
24 CAPSULE ORAL 2 TIMES DAILY WITH MEALS
Qty: 180 CAPSULE | Refills: 3 | Status: SHIPPED | OUTPATIENT
Start: 2025-06-17